# Patient Record
Sex: FEMALE | Race: WHITE | NOT HISPANIC OR LATINO | URBAN - METROPOLITAN AREA
[De-identification: names, ages, dates, MRNs, and addresses within clinical notes are randomized per-mention and may not be internally consistent; named-entity substitution may affect disease eponyms.]

---

## 2020-10-27 ENCOUNTER — TELEPHONE (OUTPATIENT)
Dept: GASTROENTEROLOGY | Facility: MEDICAL CENTER | Age: 20
End: 2020-10-27

## 2020-11-06 ENCOUNTER — OFFICE VISIT (OUTPATIENT)
Dept: GASTROENTEROLOGY | Facility: MEDICAL CENTER | Age: 20
End: 2020-11-06
Payer: COMMERCIAL

## 2020-11-06 VITALS
WEIGHT: 135 LBS | SYSTOLIC BLOOD PRESSURE: 124 MMHG | HEIGHT: 66 IN | HEART RATE: 56 BPM | TEMPERATURE: 98.1 F | DIASTOLIC BLOOD PRESSURE: 82 MMHG | BODY MASS INDEX: 21.69 KG/M2

## 2020-11-06 DIAGNOSIS — K21.9 GASTROESOPHAGEAL REFLUX DISEASE WITHOUT ESOPHAGITIS: Primary | ICD-10-CM

## 2020-11-06 PROCEDURE — 99204 OFFICE O/P NEW MOD 45 MIN: CPT | Performed by: INTERNAL MEDICINE

## 2020-11-06 RX ORDER — GABAPENTIN 100 MG/1
100 CAPSULE ORAL DAILY
COMMUNITY

## 2020-11-10 ENCOUNTER — TELEPHONE (OUTPATIENT)
Dept: GASTROENTEROLOGY | Facility: MEDICAL CENTER | Age: 20
End: 2020-11-10

## 2020-12-29 ENCOUNTER — ANESTHESIA EVENT (OUTPATIENT)
Dept: GASTROENTEROLOGY | Facility: MEDICAL CENTER | Age: 20
End: 2020-12-29

## 2020-12-29 ENCOUNTER — ANESTHESIA (OUTPATIENT)
Dept: GASTROENTEROLOGY | Facility: MEDICAL CENTER | Age: 20
End: 2020-12-29

## 2020-12-29 ENCOUNTER — HOSPITAL ENCOUNTER (OUTPATIENT)
Dept: GASTROENTEROLOGY | Facility: MEDICAL CENTER | Age: 20
Setting detail: OUTPATIENT SURGERY
Discharge: HOME | End: 2020-12-29
Attending: INTERNAL MEDICINE
Payer: COMMERCIAL

## 2020-12-29 VITALS
BODY MASS INDEX: 21.69 KG/M2 | SYSTOLIC BLOOD PRESSURE: 102 MMHG | HEIGHT: 66 IN | OXYGEN SATURATION: 100 % | DIASTOLIC BLOOD PRESSURE: 55 MMHG | RESPIRATION RATE: 16 BRPM | TEMPERATURE: 98.7 F | HEART RATE: 45 BPM | WEIGHT: 135 LBS

## 2020-12-29 VITALS — HEART RATE: 93 BPM

## 2020-12-29 DIAGNOSIS — K21.9 GASTROESOPHAGEAL REFLUX DISEASE WITHOUT ESOPHAGITIS: ICD-10-CM

## 2020-12-29 PROBLEM — G93.5 CHIARI I MALFORMATION (HCC): Status: ACTIVE | Noted: 2020-12-29

## 2020-12-29 LAB
EXT PREGNANCY TEST URINE: NEGATIVE
EXT. CONTROL: NORMAL

## 2020-12-29 PROCEDURE — 88305 TISSUE EXAM BY PATHOLOGIST: CPT | Performed by: PATHOLOGY

## 2020-12-29 PROCEDURE — 43239 EGD BIOPSY SINGLE/MULTIPLE: CPT | Performed by: INTERNAL MEDICINE

## 2020-12-29 PROCEDURE — 81025 URINE PREGNANCY TEST: CPT | Performed by: ANESTHESIOLOGY

## 2020-12-29 RX ORDER — ONDANSETRON 2 MG/ML
4 INJECTION INTRAMUSCULAR; INTRAVENOUS ONCE AS NEEDED
Status: DISCONTINUED | OUTPATIENT
Start: 2020-12-29 | End: 2021-01-02 | Stop reason: HOSPADM

## 2020-12-29 RX ORDER — ALBUTEROL SULFATE 2.5 MG/3ML
2.5 SOLUTION RESPIRATORY (INHALATION) ONCE AS NEEDED
Status: DISCONTINUED | OUTPATIENT
Start: 2020-12-29 | End: 2021-01-02 | Stop reason: HOSPADM

## 2020-12-29 RX ORDER — SODIUM CHLORIDE 9 MG/ML
125 INJECTION, SOLUTION INTRAVENOUS CONTINUOUS
Status: DISCONTINUED | OUTPATIENT
Start: 2020-12-29 | End: 2021-01-02 | Stop reason: HOSPADM

## 2020-12-29 RX ORDER — LIDOCAINE HYDROCHLORIDE 20 MG/ML
INJECTION, SOLUTION EPIDURAL; INFILTRATION; INTRACAUDAL; PERINEURAL AS NEEDED
Status: DISCONTINUED | OUTPATIENT
Start: 2020-12-29 | End: 2020-12-29

## 2020-12-29 RX ORDER — SODIUM CHLORIDE 9 MG/ML
125 INJECTION, SOLUTION INTRAVENOUS CONTINUOUS
Status: CANCELLED | OUTPATIENT
Start: 2020-12-29

## 2020-12-29 RX ORDER — DESOGESTREL AND ETHINYL ESTRADIOL 0.15-0.03
1 KIT ORAL DAILY
COMMUNITY

## 2020-12-29 RX ORDER — PROPOFOL 10 MG/ML
INJECTION, EMULSION INTRAVENOUS AS NEEDED
Status: DISCONTINUED | OUTPATIENT
Start: 2020-12-29 | End: 2020-12-29

## 2020-12-29 RX ADMIN — LIDOCAINE HYDROCHLORIDE 100 MG: 20 INJECTION, SOLUTION EPIDURAL; INFILTRATION; INTRACAUDAL; PERINEURAL at 12:51

## 2020-12-29 RX ADMIN — PROPOFOL 100 MG: 10 INJECTION, EMULSION INTRAVENOUS at 12:51

## 2020-12-29 RX ADMIN — PROPOFOL 100 MG: 10 INJECTION, EMULSION INTRAVENOUS at 12:52

## 2020-12-29 RX ADMIN — SODIUM CHLORIDE: 0.9 INJECTION, SOLUTION INTRAVENOUS at 12:31

## 2020-12-29 RX ADMIN — PROPOFOL 50 MG: 10 INJECTION, EMULSION INTRAVENOUS at 12:54

## 2020-12-29 NOTE — DISCHARGE INSTRUCTIONS
Gastroesophageal Reflux Disease   WHAT YOU NEED TO KNOW:   Gastroesophageal reflux disease (GERD) is reflux that occurs more than twice a week for a few weeks  Reflux means acid and food in the stomach back up into the esophagus  It usually causes heartburn and other symptoms  GERD can cause other health problems over time if it is not treated  DISCHARGE INSTRUCTIONS:   Call your local emergency number (911 in the [address] [facility]) if:   · You have severe chest pain and sudden trouble breathing  Seek care immediately if:   · You have trouble breathing after you vomit  · You have trouble swallowing, or pain with swallowing  · Your bowel movements are black, bloody, or tarry-looking  · Your vomit looks like coffee grounds or has blood in it  Call your doctor or gastroenterologist if:   · You feel full and cannot burp or vomit  · You vomit large amounts, or you vomit often  · You are losing weight without trying  · Your symptoms get worse or do not improve with treatment  · You have questions or concerns about your condition or care  Medicines:   · Medicines  are used to decrease stomach acid  Medicine may also be used to help your lower esophageal sphincter and stomach contract (tighten) more  · Take your medicine as directed  Contact your healthcare provider if you think your medicine is not helping or if you have side effects  Tell him or her if you are allergic to any medicine  Keep a list of the medicines, vitamins, and herbs you take  Include the amounts, and when and why you take them  Bring the list or the pill bottles to follow-up visits  Carry your medicine list with you in case of an emergency  Manage GERD:   · Do not have foods or drinks that may increase heartburn  These include chocolate, peppermint, fried or fatty foods, drinks that contain caffeine, or carbonated drinks (soda)  Other foods include spicy foods, onions, tomatoes, and tomato-based foods   Do not have foods or drinks that can irritate your esophagus, such as citrus fruits, juices, and alcohol  · Do not eat large meals  When you eat a lot of food at one time, your stomach needs more acid to digest it  Eat 6 small meals each day instead of 3 large ones, and eat slowly  Do not eat meals 2 to 3 hours before bedtime  · Elevate the head of your bed  Place 6-inch blocks under the head of your bed frame  You may also use more than one pillow under your head and shoulders while you sleep  · Maintain a healthy weight  If you are overweight, weight loss may help relieve symptoms of GERD  · Do not smoke  Smoking weakens the lower esophageal sphincter and increases the risk of GERD  Ask your healthcare provider for information if you currently smoke and need help to quit  E-cigarettes or smokeless tobacco still contain nicotine  Talk to your healthcare provider before you use these products  · Do not wear clothing that is tight around your waist   Tight clothing can put pressure on your stomach and cause or worsen GERD symptoms  Follow up with your doctor or gastroenterologist as directed:  Write down your questions so you remember to ask them during your visits  © Copyright [address] Information is for End User's use only and may not be sold, redistributed or otherwise used for commercial purposes  All illustrations and images included in CareNotes® are the copyrighted property of A D A M , Inc  or [facility] Mariana Kruse   The above information is an  only  It is not intended as medical advice for individual conditions or treatments  Talk to your doctor, nurse or pharmacist before following any medical regimen to see if it is safe and effective for you  Upper Endoscopy   WHAT YOU NEED TO KNOW:   An upper endoscopy is also called an upper gastrointestinal (GI) endoscopy, or an esophagogastroduodenoscopy (EGD)   You may feel bloated, gassy, or have some abdominal discomfort after your procedure  Your throat may be sore for 24 to 36 hours  You may burp or pass gas from air that is still inside your body  DISCHARGE INSTRUCTIONS:   Call 911 for any of the following:   · You have sudden chest pain or trouble breathing  Seek care immediately if:   · You feel dizzy or faint  · You have trouble swallowing  · Your bowel movements are very dark or black  · Your abdomen is hard and firm and you have severe pain  · You vomit blood  Contact your healthcare provider if:   · You feel full or bloated and cannot burp or pass gas  · You have not had a bowel movement for 3 days after your procedure  · You have neck pain  · You have a fever or chills  · You have nausea or are vomiting  · You have a rash or hives  · You have questions or concerns about your endoscopy  Relieve a sore throat:  Suck on throat lozenges or crushed ice  Gargle with a small amount of warm salt water  Mix 1 teaspoon of salt and 1 cup of warm water to make salt water  Relieve gas and discomfort from bloating:  Lie on your right side with a heating pad on your abdomen  Take short walks to help pass gas  Eat small meals until bloating is relieved  Rest after your procedure: You have been given medicine to relax you  Do not  drive or make important decisions until the day after your procedure  Return to your normal activity as directed  You can usually return to work the day after your procedure  Follow up with your healthcare provider as directed:  Write down your questions so you remember to ask them during your visits  © 2017 [address] is for End User's use only and may not be sold, redistributed or otherwise used for commercial purposes  All illustrations and images included in CareNotes® are the copyrighted property of [employer]  or Merlin Vanegas  The above information is an  only   It is not intended as medical advice for individual conditions or treatments  Talk to your doctor, nurse or pharmacist before following any medical regimen to see if it is safe and effective for you

## 2020-12-29 NOTE — H&P
History and Physical -  [facility]  Haile Pina 23 y o  female MRN: [id number]                  HPI: Haile Pina is a 23y o  year old female who presents for EGD for GERD and to rule out hiatal hernia, esophagitis and H pylori  REVIEW OF SYSTEMS: Per the HPI, and otherwise unremarkable  Historical Information   No past medical history on file  No past surgical history on file  Social History   Social History     Substance and Sexual Activity   Alcohol Use Not on file     Social History     Substance and Sexual Activity   Drug Use Not on file     Social History     Tobacco Use   Smoking Status Never Smoker   Smokeless Tobacco Never Used     No family history on file  Meds/Allergies     (Not in a hospital admission)      Allergies   Allergen Reactions    Lactase      gerd       Objective     There were no vitals taken for this visit  PHYSICAL EXAM    Gen: NAD  CV: RRR  CHEST: Clear  ABD: soft, NT/ND  EXT: no edema      ASSESSMENT/PLAN:  Haile Pina is a 23y o  year old female who presents for EGD for GERD and to rule out hiatal hernia, esophagitis and H pylori  The patient is stable and optimized for the procedure, we reviewed risk and benefits  Risk include but not limited to infection, bleeding, perforation and missing a lesion

## 2021-01-06 ENCOUNTER — TELEPHONE (OUTPATIENT)
Dept: GASTROENTEROLOGY | Facility: MEDICAL CENTER | Age: 21
End: 2021-01-06

## 2021-01-06 NOTE — TELEPHONE ENCOUNTER
[employer]      Result Notes     Palmer Antonio   1/6/2021  2:38 PM EST      Called & left patient a detailed message with benign results    Madelaine Hunt MD   1/4/2021 10:18 AM EST      EGD biopsy was benign, showing inactive gastritis, non urgent follow up in office for GERD

## 2021-02-16 ENCOUNTER — OFFICE VISIT (OUTPATIENT)
Dept: GASTROENTEROLOGY | Facility: MEDICAL CENTER | Age: 21
End: 2021-02-16
Payer: COMMERCIAL

## 2021-02-16 VITALS
SYSTOLIC BLOOD PRESSURE: 122 MMHG | DIASTOLIC BLOOD PRESSURE: 70 MMHG | BODY MASS INDEX: 22.5 KG/M2 | WEIGHT: 140 LBS | TEMPERATURE: 99.2 F | HEIGHT: 66 IN

## 2021-02-16 DIAGNOSIS — K21.9 GASTROESOPHAGEAL REFLUX DISEASE WITHOUT ESOPHAGITIS: Primary | ICD-10-CM

## 2021-02-16 PROCEDURE — 99212 OFFICE O/P EST SF 10 MIN: CPT | Performed by: INTERNAL MEDICINE

## 2021-02-16 NOTE — PROGRESS NOTES
[facility] - Outpatient Follow-up Note  John Grow 23 y o  female MRN: [id number]  Encounter: [id number]          ASSESSMENT AND PLAN:      1  Gastroesophageal reflux disease without esophagitis  She started diet modification with significant improvement of her symptoms  She takes Nexium maybe once every 2 weeks for symptom relief  She understands biopsy result after It was explained to her  Patient is doing much better with diet modification, daily PPI will be unnecessary  For her  Will recommend to follow-up once every 2 to 3 years  ______________________________________________________________________    SUBJECTIVE:      24 yo F with 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 presented for evaluation of acid reflux disease  She underwent EGD for GERD, it showed  · Irregular Z-line 36 cm from the incisors  Variation less than 1 cm, no haro's esophagus, no esophagitis  · The stomach appeared normal  Performed random biopsy  · The duodenum appeared normal  Performed random biopsy    biopsy showed:A  Duodenum, bx, R/O celiac:  - Small bowel mucosa with no significant histopathologic abnormality   - Negative for malabsorption pattern  - Negative for malignancy       B  Stomach, gastric bx, R/O H  pylori:  - Mild chronic inactive gastritis  - Negative for H  pylori by routine H&E   - Negative for malignancy  She started diet modification with significant improvement of her symptoms  She takes Nexium maybe once every 2 weeks for symptom relief  REVIEW OF SYSTEMS IS OTHERWISE NEGATIVE  Historical Information   No past medical history on file    Past Surgical History:   Procedure Laterality Date    BRAIN SURGERY       Social History   Social History     Substance and Sexual Activity   Alcohol Use Yes    Comment: social      Social History     Substance and Sexual Activity   Drug Use Not Currently     Social History     Tobacco Use   Smoking Status Never Smoker   Smokeless Tobacco Never Used     No family history on file  Meds/Allergies       Current Outpatient Medications:     desogestrel-ethinyl estradiol (APRI) 0 15-30 MG-MCG per tablet    Esomeprazole Magnesium (NEXIUM PO)    gabapentin (NEURONTIN) 100 mg capsule    Allergies   Allergen Reactions    Lactase      gerd           Objective     Blood pressure 122/70, temperature 99 2 °F (37 3 °C), temperature source Tympanic, height 5' 6" (1 676 m), weight 63 5 kg (140 lb)  Body mass index is 22 6 kg/m²  PHYSICAL EXAM:      General Appearance:   Alert, cooperative, no distress   HEENT:   Normocephalic, atraumatic, anicteric      Neck:  Supple, symmetrical, trachea midline   Lungs:   Clear to auscultation bilaterally; no rales, rhonchi or wheezing; respirations unlabored    Heart[de-identified]   Regular rate and rhythm; no murmur, rub, or gallop  Abdomen:   Soft, non-tender, non-distended; normal bowel sounds; no masses, no organomegaly    Genitalia:   Deferred    Rectal:   Deferred    Extremities:  No cyanosis, clubbing or edema    Pulses:  2+ and symmetric    Skin:  No jaundice, rashes, or lesions    Lymph nodes:  No palpable cervical lymphadenopathy        Lab Results:   No visits with results within 1 Day(s) from this visit     Latest known visit with results is:   Hospital Outpatient Visit on 12/29/2020   Component Date Value    EXT Preg Test, Ur 12/29/2020 Negative     Control 12/29/2020 Valid     Case Report 12/29/2020                      Value:Surgical Pathology Report                         Case: [id number]                                   Authorizing Provider:  Gabriele Wang MD              Collected:           12/29/2020 1254              Ordering Location:     [facility]        Received:            12/29/2020 [address] [place] [address] Endoscopy                                                     Pathologist:           Evita Fuentes MD Specimens:   A) - Duodenum, bx, R/O celiac                                                                       B) - Stomach, gastric bx, R/O H  pylori                                                    Final Diagnosis 12/29/2020                      Value: This result contains rich text formatting which cannot be displayed here   Additional Information 12/29/2020                      Value: This result contains rich text formatting which cannot be displayed here  Millie Suazo Gross Description 12/29/2020                      Value: This result contains rich text formatting which cannot be displayed here  Radiology Results:   No results found  Answers for HPI/ROS submitted by the patient on 2/9/2021   Abdominal pain  Chronicity: recurrent  Onset: more than 1 year ago  Onset quality: undetermined  Frequency: every several days  Episode duration: 4 hours  Progression since onset: rapidly improving  Pain location: epigastric region, suprapubic region, right flank  Pain - numeric: 5/10  Pain quality: aching, burning, cramping, a sensation of fullness  Radiates to: does not radiate  anorexia: No  arthralgias: No  belching: Yes  constipation: Yes  diarrhea: Yes  dysuria: No  fever: No  flatus: Yes  frequency: Yes  headaches: No  hematochezia: No  hematuria: No  melena: No  myalgias: No  nausea:  No  weight loss: No  vomiting: No  Aggravated by: drinking alcohol, eating  Relieved by: activity, belching, bowel movements, certain positions, liquids, passing flatus

## 2021-11-07 ENCOUNTER — INPATIENT (INPATIENT)
Facility: HOSPITAL | Age: 21
LOS: 1 days | Discharge: HOME | DRG: 156 | End: 2021-11-09
Attending: HOSPITALIST | Admitting: FAMILY MEDICINE
Payer: COMMERCIAL

## 2021-11-07 VITALS
RESPIRATION RATE: 18 BRPM | SYSTOLIC BLOOD PRESSURE: 132 MMHG | TEMPERATURE: 99 F | WEIGHT: 139.99 LBS | HEART RATE: 86 BPM | OXYGEN SATURATION: 99 % | DIASTOLIC BLOOD PRESSURE: 80 MMHG | HEIGHT: 66 IN

## 2021-11-07 DIAGNOSIS — K11.21 ACUTE SIALOADENITIS: ICD-10-CM

## 2021-11-07 LAB
ALBUMIN SERPL ELPH-MCNC: 3.5 G/DL — SIGNIFICANT CHANGE UP (ref 3.3–5)
ALP SERPL-CCNC: 40 U/L — SIGNIFICANT CHANGE UP (ref 40–120)
ALT FLD-CCNC: 29 U/L — SIGNIFICANT CHANGE UP (ref 12–78)
ANION GAP SERPL CALC-SCNC: 7 MMOL/L — SIGNIFICANT CHANGE UP (ref 5–17)
AST SERPL-CCNC: 36 U/L — SIGNIFICANT CHANGE UP (ref 15–37)
BASOPHILS # BLD AUTO: 0.03 K/UL — SIGNIFICANT CHANGE UP (ref 0–0.2)
BASOPHILS NFR BLD AUTO: 0.2 % — SIGNIFICANT CHANGE UP (ref 0–2)
BILIRUB SERPL-MCNC: 0.5 MG/DL — SIGNIFICANT CHANGE UP (ref 0.2–1.2)
BUN SERPL-MCNC: 9 MG/DL — SIGNIFICANT CHANGE UP (ref 7–23)
CALCIUM SERPL-MCNC: 8.9 MG/DL — SIGNIFICANT CHANGE UP (ref 8.5–10.1)
CHLORIDE SERPL-SCNC: 105 MMOL/L — SIGNIFICANT CHANGE UP (ref 96–108)
CO2 SERPL-SCNC: 24 MMOL/L — SIGNIFICANT CHANGE UP (ref 22–31)
CREAT SERPL-MCNC: 0.79 MG/DL — SIGNIFICANT CHANGE UP (ref 0.5–1.3)
EOSINOPHIL # BLD AUTO: 0.04 K/UL — SIGNIFICANT CHANGE UP (ref 0–0.5)
EOSINOPHIL NFR BLD AUTO: 0.3 % — SIGNIFICANT CHANGE UP (ref 0–6)
GLUCOSE SERPL-MCNC: 99 MG/DL — SIGNIFICANT CHANGE UP (ref 70–99)
HCG SERPL-ACNC: <1 MIU/ML — SIGNIFICANT CHANGE UP
HCT VFR BLD CALC: 37 % — SIGNIFICANT CHANGE UP (ref 34.5–45)
HGB BLD-MCNC: 12.9 G/DL — SIGNIFICANT CHANGE UP (ref 11.5–15.5)
IMM GRANULOCYTES NFR BLD AUTO: 0.5 % — SIGNIFICANT CHANGE UP (ref 0–1.5)
LACTATE SERPL-SCNC: 1.2 MMOL/L — SIGNIFICANT CHANGE UP (ref 0.7–2)
LACTATE SERPL-SCNC: 2.2 MMOL/L — HIGH (ref 0.7–2)
LYMPHOCYTES # BLD AUTO: 14.5 % — SIGNIFICANT CHANGE UP (ref 13–44)
LYMPHOCYTES # BLD AUTO: 2.21 K/UL — SIGNIFICANT CHANGE UP (ref 1–3.3)
MCHC RBC-ENTMCNC: 32.6 PG — SIGNIFICANT CHANGE UP (ref 27–34)
MCHC RBC-ENTMCNC: 34.9 GM/DL — SIGNIFICANT CHANGE UP (ref 32–36)
MCV RBC AUTO: 93.4 FL — SIGNIFICANT CHANGE UP (ref 80–100)
MONOCYTES # BLD AUTO: 1.13 K/UL — HIGH (ref 0–0.9)
MONOCYTES NFR BLD AUTO: 7.4 % — SIGNIFICANT CHANGE UP (ref 2–14)
NEUTROPHILS # BLD AUTO: 11.74 K/UL — HIGH (ref 1.8–7.4)
NEUTROPHILS NFR BLD AUTO: 77.1 % — HIGH (ref 43–77)
NRBC # BLD: 0 /100 WBCS — SIGNIFICANT CHANGE UP (ref 0–0)
PLATELET # BLD AUTO: 262 K/UL — SIGNIFICANT CHANGE UP (ref 150–400)
POTASSIUM SERPL-MCNC: 4.3 MMOL/L — SIGNIFICANT CHANGE UP (ref 3.5–5.3)
POTASSIUM SERPL-SCNC: 4.3 MMOL/L — SIGNIFICANT CHANGE UP (ref 3.5–5.3)
PROT SERPL-MCNC: 7.9 G/DL — SIGNIFICANT CHANGE UP (ref 6–8.3)
RBC # BLD: 3.96 M/UL — SIGNIFICANT CHANGE UP (ref 3.8–5.2)
RBC # FLD: 11.8 % — SIGNIFICANT CHANGE UP (ref 10.3–14.5)
SARS-COV-2 RNA SPEC QL NAA+PROBE: SIGNIFICANT CHANGE UP
SODIUM SERPL-SCNC: 136 MMOL/L — SIGNIFICANT CHANGE UP (ref 135–145)
WBC # BLD: 15.23 K/UL — HIGH (ref 3.8–10.5)
WBC # FLD AUTO: 15.23 K/UL — HIGH (ref 3.8–10.5)

## 2021-11-07 PROCEDURE — 99285 EMERGENCY DEPT VISIT HI MDM: CPT

## 2021-11-07 PROCEDURE — 99222 1ST HOSP IP/OBS MODERATE 55: CPT | Mod: GC

## 2021-11-07 PROCEDURE — G1004: CPT

## 2021-11-07 PROCEDURE — 70487 CT MAXILLOFACIAL W/DYE: CPT | Mod: 26,MG

## 2021-11-07 RX ORDER — AMPICILLIN SODIUM AND SULBACTAM SODIUM 250; 125 MG/ML; MG/ML
3 INJECTION, POWDER, FOR SUSPENSION INTRAMUSCULAR; INTRAVENOUS EVERY 6 HOURS
Refills: 0 | Status: DISCONTINUED | OUTPATIENT
Start: 2021-11-07 | End: 2021-11-09

## 2021-11-07 RX ORDER — AMPICILLIN SODIUM AND SULBACTAM SODIUM 250; 125 MG/ML; MG/ML
3 INJECTION, POWDER, FOR SUSPENSION INTRAMUSCULAR; INTRAVENOUS ONCE
Refills: 0 | Status: COMPLETED | OUTPATIENT
Start: 2021-11-07 | End: 2021-11-07

## 2021-11-07 RX ORDER — OXYCODONE AND ACETAMINOPHEN 5; 325 MG/1; MG/1
1 TABLET ORAL ONCE
Refills: 0 | Status: DISCONTINUED | OUTPATIENT
Start: 2021-11-07 | End: 2021-11-07

## 2021-11-07 RX ORDER — SODIUM CHLORIDE 9 MG/ML
1000 INJECTION INTRAMUSCULAR; INTRAVENOUS; SUBCUTANEOUS
Refills: 0 | Status: COMPLETED | OUTPATIENT
Start: 2021-11-07 | End: 2021-11-07

## 2021-11-07 RX ADMIN — OXYCODONE AND ACETAMINOPHEN 1 TABLET(S): 5; 325 TABLET ORAL at 22:29

## 2021-11-07 RX ADMIN — SODIUM CHLORIDE 1000 MILLILITER(S): 9 INJECTION INTRAMUSCULAR; INTRAVENOUS; SUBCUTANEOUS at 21:23

## 2021-11-07 RX ADMIN — SODIUM CHLORIDE 1000 MILLILITER(S): 9 INJECTION INTRAMUSCULAR; INTRAVENOUS; SUBCUTANEOUS at 21:08

## 2021-11-07 RX ADMIN — AMPICILLIN SODIUM AND SULBACTAM SODIUM 200 GRAM(S): 250; 125 INJECTION, POWDER, FOR SUSPENSION INTRAMUSCULAR; INTRAVENOUS at 20:22

## 2021-11-07 RX ADMIN — SODIUM CHLORIDE 1000 MILLILITER(S): 9 INJECTION INTRAMUSCULAR; INTRAVENOUS; SUBCUTANEOUS at 20:22

## 2021-11-07 RX ADMIN — AMPICILLIN SODIUM AND SULBACTAM SODIUM 3 GRAM(S): 250; 125 INJECTION, POWDER, FOR SUSPENSION INTRAMUSCULAR; INTRAVENOUS at 21:23

## 2021-11-07 RX ADMIN — OXYCODONE AND ACETAMINOPHEN 1 TABLET(S): 5; 325 TABLET ORAL at 22:41

## 2021-11-07 RX ADMIN — OXYCODONE AND ACETAMINOPHEN 1 TABLET(S): 5; 325 TABLET ORAL at 20:29

## 2021-11-07 RX ADMIN — SODIUM CHLORIDE 1000 MILLILITER(S): 9 INJECTION INTRAMUSCULAR; INTRAVENOUS; SUBCUTANEOUS at 20:08

## 2021-11-07 NOTE — H&P ADULT - ASSESSMENT
The patient is a 22 yo F with pmhx significant for a chiari malformation s/p repair 2 years ago, who presents with L sided facial swelling and pain that started yesterday and has worsened. Admit for L parotiditis and sialoadenitis.

## 2021-11-07 NOTE — H&P ADULT - PROBLEM SELECTOR PLAN 1
- does not meet sepsis criteria on admission, WBC 15.23, afebrile, RR 18, HR 86. lactate 2.2 --> 1.2  - s/p Unasyn 3g IV x1, 1L NS x1, percocet 5/325mg x2 doses in ED, continue unasyn 3g q6hrs and clindamycin 300mg q8hrs  - continue diet, easy to chew, as tolerated  - f/u blood cultures  - CT maxillofacial w/ contrast: L sided parotiditis w/ L submandibular gland sialoadenitis w/ inflammatory change and fluid w/ reactive lymphadenopathy   - ID consulted, Dr Garcia, f/u recs  - ENT consulted, f/u recs - does not meet sepsis criteria on admission, WBC 15.23, afebrile, RR 18, HR 86. lactate 2.2 --> 1.2  - s/p Unasyn 3g IV x1, 1L NS x1, percocet 5/325mg x2 doses in ED, continue unasyn 3g q6hrs and clindamycin 300mg q8hrs  - continue tylenol 650mg q6hr prn mild pain, percocet 0.5mg q6hr moderate pain, percocet 1.0mg q6hr severe pain  - continue diet, easy to chew, as tolerated  - f/u blood cultures  - CT maxillofacial w/ contrast: L sided parotiditis w/ L submandibular gland sialoadenitis w/ inflammatory change and fluid w/ reactive lymphadenopathy   - ID consulted, Dr Garcia, f/u recs  - ENT consulted, f/u recs

## 2021-11-07 NOTE — H&P ADULT - HISTORY OF PRESENT ILLNESS
The patient is a 20 yo F with no significant pmhx who presents with L sided facial swelling and pain that started yesterday. The patient ?denies trauma to the area. The patient states the pain___    IN THE ED:  Temp  98.6 F, HR 86, /80, RR 18, SpO2 99%  S/P unasyn 3g x1, NS 0.9% 1L x1, percocet 5/325mg x2 doses  Labs significant for WBC 15.23, Neutro 11.74, lactate 2.2 --> 1.2  Imaging: CT maxillofacial w/ contrast: L sided parotiditis w/ L submandibular gland sialoadenitis w/ inflammatory change and fluid w/ reactive lymphadenopathy.      The patient is a 20 yo F with pmhx significant for a chiari malformation s/p repair 2 years ago, who presents with L sided facial swelling and pain that started yesterday and has worsened. The patient endorses 7-8/10 radiating L sided facial pain that radiates up into L temporal region and down into midline of neck. The patient endorses L sided facial drooling earlier today, pain with chewing, SOB, decreased hearing on L. Also endorses having been flying from [place] -> [place] -> [place] all day today. Denies any trauma to the area, any dental concerns (has seen dentist within last year), fully vaccinated for COVID, denies recent sick contacts, including her boyfriend who she was traveling with).      IN THE ED:  Temp  98.6 F, HR 86, /80, RR 18, SpO2 99%  S/P unasyn 3g x1, NS 0.9% 1L x1, percocet 5/325mg x2 doses  Labs significant for WBC 15.23, Neutro 11.74, lactate 2.2 --> 1.2  Imaging: CT maxillofacial w/ contrast: L sided parotiditis w/ L submandibular gland sialoadenitis w/ inflammatory change and fluid w/ reactive lymphadenopathy      The patient is a 22 yo F with pmhx significant for a chiari malformation s/p repair 2 years ago, who presents with L sided facial swelling and pain that started yesterday and has worsened. The patient endorses 7-8/10 radiating L sided facial pain that radiates up into L temporal region and down into midline of neck. The patient endorses L sided facial drooling earlier today, pain with chewing, SOB, decreased hearing on L. Also endorses having been flying from [place] -> [place] -> [place] all day today. Denies any trauma to the area, any dental concerns (has seen dentist within last year), fully vaccinated for COVID, denies recent sick contacts, including her boyfriend who she was traveling with). Currently endorses 4-5/10 pain after Percocet x2 doses in ED.       IN THE ED:  Temp  98.6 F, HR 86, /80, RR 18, SpO2 99%  S/P unasyn 3g x1, NS 0.9% 1L x1, percocet 5/325mg x2 doses  Labs significant for WBC 15.23, Neutro 11.74, lactate 2.2 --> 1.2  Imaging: CT maxillofacial w/ contrast: L sided parotiditis w/ L submandibular gland sialoadenitis w/ inflammatory change and fluid w/ reactive lymphadenopathy      The patient is a 20 yo F with pmhx significant for a chiari malformation s/p repair 2 years ago, who presents with L sided facial swelling and pain that started yesterday and has worsened. The patient endorses 7-8/10 radiating L sided facial pain that radiates up into L temporal region and down into midline of neck. The patient endorses L sided facial drooling earlier today, pain with chewing, SOB, decreased hearing on L. Also endorses having been flying from [place] -> [place] -> [place] all day today. Denies any trauma to the area, any dental concerns (has seen dentist within last year), fully vaccinated for COVID, denies recent sick contacts, including her boyfriend who she was traveling with). Currently endorses 4-5/10 pain after Percocet x2 doses in ED.     Of note, patient endorses <10 ear infections throughout lifetime, with last being July 2020. The patient states around age 3 she had b/l ear tube placement.       IN THE ED:  Temp  98.6 F, HR 86, /80, RR 18, SpO2 99%  S/P unasyn 3g x1, NS 0.9% 1L x1, percocet 5/325mg x2 doses  Labs significant for WBC 15.23, Neutro 11.74, lactate 2.2 --> 1.2  Imaging: CT maxillofacial w/ contrast: L sided parotiditis w/ L submandibular gland sialoadenitis w/ inflammatory change and fluid w/ reactive lymphadenopathy

## 2021-11-07 NOTE — ED ADULT NURSE NOTE - OBJECTIVE STATEMENT
Received pt awake and alert, c/o left side facial swelling since yesterday, now also c/o body aches, denies any dental issues.

## 2021-11-07 NOTE — ED PROVIDER NOTE - CHPI ED SYMPTOMS NEG
no blurred vision/no fever/no numbness/no syncope/no vomiting/no change in level of consciousness/no chills

## 2021-11-07 NOTE — ED PROVIDER NOTE - OBJECTIVE STATEMENT
22 yo F p/w L sided facial pain and swelling since yesterday. No fever/chills. no cough/ uri. Pt fully vaccinated for covid, no recent exposures. no abd pain .no n/v/d. Pt with some dec po intake due to discomfort. no neck pain / stiffness/ photophobia. no recent travel. no sick contacts. no agg/allev factors. no prior events. no other acute co.

## 2021-11-07 NOTE — H&P ADULT - TIME BILLING
care coordination, plan of care discussed with patient face to face with boyfriend at bedside with pt permission

## 2021-11-07 NOTE — H&P ADULT - NSHPPHYSICALEXAM_GEN_ALL_CORE
PHYSICAL EXAM:  Gen: appears comfortable, in NAD  HEENT: NCAT__________  Cardio: regular rate and rhythm, +s1s2, no murmurs, rubs, or gallops  Pulm: CTA b/l, no wheezes, rales or rhonchi  Abdomen: normoactive bowel sounds, soft, nontender, nondistended  Extremities: no edema   Neuro: AAOx3, good movement of all four extremities  Skin: warm and dry, no obvious rashes or lesions, except for facial swelling as noted above PHYSICAL EXAM:  Gen: appears comfortable, in NAD  HEENT: NCAT, mmm, no pharyngeal erythema appreciated though pt unable to fully open mouth 2/2 pain, R ear TM erythematous, L ear TM without erythema, L sided facial swelling extending to TMJ and TTP to midline neck. no obvious lesions or rashes on external skin or external ear  Cardio: regular rate and rhythm, +s1s2, no murmurs, rubs, or gallops  Pulm: CTA b/l, no wheezes, rales or rhonchi  Abdomen: normoactive bowel sounds, soft, nontender, nondistended  Extremities: no edema   Neuro: AAOx3, good movement of all four extremities  Skin: warm and dry, no obvious rashes or lesions, except for facial swelling as noted above PHYSICAL EXAM:  Gen: appears comfortable, in NAD  HEENT: NCAT, mmm, no pharyngeal erythema appreciated though pt unable to fully open mouth 2/2 pain, R ear TM erythematous, L ear TM without erythema, L sided facial swelling extending to TMJ and TTP to midline neck. no obvious lesions or rashes on external skin or external ear  Cardio: regular rate and rhythm, +s1s2, no murmurs, rubs, or gallops  Pulm: CTA b/l, no wheezes, rales or rhonchi  Abdomen: normoactive bowel sounds, soft, nontender, nondistended  Back: no C spine spinous process tenderness, chairi malformation s/p repair scar noted extending from C7 up to C2 without any appreciable erythema, swelling, wounds  Extremities: no edema   Neuro: AAOx3, good movement of all four extremities  Skin: warm and dry, no obvious rashes or lesions, except for facial swelling as noted above

## 2021-11-07 NOTE — H&P ADULT - ATTENDING COMMENTS
22 yo F with pmhx significant for a chiari malformation s/p repair 2 years ago, who presents with L sided facial swelling and pain that started yesterday and has worsened. Admit for L parotiditis and sialoadenitis. Plan: cont iv antibx, apprec ID Dr Ai jimenez, apprec ENT recs Dr Gaffney, monitor clinical course, warm compresses

## 2021-11-07 NOTE — H&P ADULT - NSHPREVIEWOFSYSTEMS_GEN_ALL_CORE
REVIEW OF SYSTEMS:  Constitutional: denies fever or chills  HEENT: endorses L facial pain, swelling. denies headache, dizziness, or lightheadedness  Respiratory: denies SOB or cough   Cardiovascular: denies CP or palpitations  Gastrointestinal: denies nausea, vomiting, diarrhea, abdominal pain, or hematochezia  Genitourinary: denies hematuria, dysuria, frequency, urgency  Musculoskeletal: denies muscle aches, joint swelling, or muscle weakness

## 2021-11-08 ENCOUNTER — TRANSCRIPTION ENCOUNTER (OUTPATIENT)
Age: 21
End: 2021-11-08

## 2021-11-08 DIAGNOSIS — G93.5 COMPRESSION OF BRAIN: Chronic | ICD-10-CM

## 2021-11-08 DIAGNOSIS — Z29.9 ENCOUNTER FOR PROPHYLACTIC MEASURES, UNSPECIFIED: ICD-10-CM

## 2021-11-08 DIAGNOSIS — R60.0 LOCALIZED EDEMA: ICD-10-CM

## 2021-11-08 LAB
ALBUMIN SERPL ELPH-MCNC: 2.8 G/DL — LOW (ref 3.3–5)
ALP SERPL-CCNC: 36 U/L — LOW (ref 40–120)
ALT FLD-CCNC: 20 U/L — SIGNIFICANT CHANGE UP (ref 12–78)
ANION GAP SERPL CALC-SCNC: 7 MMOL/L — SIGNIFICANT CHANGE UP (ref 5–17)
AST SERPL-CCNC: 17 U/L — SIGNIFICANT CHANGE UP (ref 15–37)
BASOPHILS # BLD AUTO: 0.02 K/UL — SIGNIFICANT CHANGE UP (ref 0–0.2)
BASOPHILS NFR BLD AUTO: 0.2 % — SIGNIFICANT CHANGE UP (ref 0–2)
BILIRUB SERPL-MCNC: 0.6 MG/DL — SIGNIFICANT CHANGE UP (ref 0.2–1.2)
BUN SERPL-MCNC: 5 MG/DL — LOW (ref 7–23)
CALCIUM SERPL-MCNC: 8.5 MG/DL — SIGNIFICANT CHANGE UP (ref 8.5–10.1)
CHLORIDE SERPL-SCNC: 109 MMOL/L — HIGH (ref 96–108)
CO2 SERPL-SCNC: 23 MMOL/L — SIGNIFICANT CHANGE UP (ref 22–31)
CREAT SERPL-MCNC: 0.69 MG/DL — SIGNIFICANT CHANGE UP (ref 0.5–1.3)
CRP SERPL-MCNC: 61 MG/L — HIGH
EOSINOPHIL # BLD AUTO: 0.04 K/UL — SIGNIFICANT CHANGE UP (ref 0–0.5)
EOSINOPHIL NFR BLD AUTO: 0.5 % — SIGNIFICANT CHANGE UP (ref 0–6)
ERYTHROCYTE [SEDIMENTATION RATE] IN BLOOD: 16 MM/HR — HIGH (ref 0–15)
GLUCOSE SERPL-MCNC: 120 MG/DL — HIGH (ref 70–99)
HCT VFR BLD CALC: 34.1 % — LOW (ref 34.5–45)
HGB BLD-MCNC: 11.7 G/DL — SIGNIFICANT CHANGE UP (ref 11.5–15.5)
HIV 1+2 AB+HIV1 P24 AG SERPL QL IA: SIGNIFICANT CHANGE UP
IMM GRANULOCYTES NFR BLD AUTO: 0.5 % — SIGNIFICANT CHANGE UP (ref 0–1.5)
LYMPHOCYTES # BLD AUTO: 0.78 K/UL — LOW (ref 1–3.3)
LYMPHOCYTES # BLD AUTO: 9.3 % — LOW (ref 13–44)
MCHC RBC-ENTMCNC: 32.4 PG — SIGNIFICANT CHANGE UP (ref 27–34)
MCHC RBC-ENTMCNC: 34.3 GM/DL — SIGNIFICANT CHANGE UP (ref 32–36)
MCV RBC AUTO: 94.5 FL — SIGNIFICANT CHANGE UP (ref 80–100)
MONOCYTES # BLD AUTO: 0.54 K/UL — SIGNIFICANT CHANGE UP (ref 0–0.9)
MONOCYTES NFR BLD AUTO: 6.5 % — SIGNIFICANT CHANGE UP (ref 2–14)
MUV AB SER-ACNC: POSITIVE — SIGNIFICANT CHANGE UP
MUV IGG FLD-ACNC: 69.6 AU/ML — SIGNIFICANT CHANGE UP
NEUTROPHILS # BLD AUTO: 6.94 K/UL — SIGNIFICANT CHANGE UP (ref 1.8–7.4)
NEUTROPHILS NFR BLD AUTO: 83 % — HIGH (ref 43–77)
NRBC # BLD: 0 /100 WBCS — SIGNIFICANT CHANGE UP (ref 0–0)
PLATELET # BLD AUTO: 187 K/UL — SIGNIFICANT CHANGE UP (ref 150–400)
POTASSIUM SERPL-MCNC: 3.7 MMOL/L — SIGNIFICANT CHANGE UP (ref 3.5–5.3)
POTASSIUM SERPL-SCNC: 3.7 MMOL/L — SIGNIFICANT CHANGE UP (ref 3.5–5.3)
PROT SERPL-MCNC: 6.5 G/DL — SIGNIFICANT CHANGE UP (ref 6–8.3)
RBC # BLD: 3.61 M/UL — LOW (ref 3.8–5.2)
RBC # FLD: 11.9 % — SIGNIFICANT CHANGE UP (ref 10.3–14.5)
RHEUMATOID FACT SERPL-ACNC: <10 IU/ML — SIGNIFICANT CHANGE UP (ref 0–13)
SODIUM SERPL-SCNC: 139 MMOL/L — SIGNIFICANT CHANGE UP (ref 135–145)
WBC # BLD: 8.36 K/UL — SIGNIFICANT CHANGE UP (ref 3.8–10.5)
WBC # FLD AUTO: 8.36 K/UL — SIGNIFICANT CHANGE UP (ref 3.8–10.5)

## 2021-11-08 PROCEDURE — 99233 SBSQ HOSP IP/OBS HIGH 50: CPT

## 2021-11-08 PROCEDURE — 99222 1ST HOSP IP/OBS MODERATE 55: CPT

## 2021-11-08 RX ORDER — OXYCODONE AND ACETAMINOPHEN 5; 325 MG/1; MG/1
1 TABLET ORAL EVERY 6 HOURS
Refills: 0 | Status: DISCONTINUED | OUTPATIENT
Start: 2021-11-08 | End: 2021-11-09

## 2021-11-08 RX ORDER — DEXAMETHASONE 0.5 MG/5ML
5 ELIXIR ORAL EVERY 8 HOURS
Refills: 0 | Status: DISCONTINUED | OUTPATIENT
Start: 2021-11-08 | End: 2021-11-09

## 2021-11-08 RX ORDER — LANOLIN ALCOHOL/MO/W.PET/CERES
3 CREAM (GRAM) TOPICAL AT BEDTIME
Refills: 0 | Status: DISCONTINUED | OUTPATIENT
Start: 2021-11-08 | End: 2021-11-09

## 2021-11-08 RX ORDER — ONDANSETRON 8 MG/1
4 TABLET, FILM COATED ORAL EVERY 8 HOURS
Refills: 0 | Status: DISCONTINUED | OUTPATIENT
Start: 2021-11-08 | End: 2021-11-09

## 2021-11-08 RX ORDER — ACETAMINOPHEN 500 MG
650 TABLET ORAL EVERY 6 HOURS
Refills: 0 | Status: DISCONTINUED | OUTPATIENT
Start: 2021-11-08 | End: 2021-11-09

## 2021-11-08 RX ORDER — SODIUM CHLORIDE 9 MG/ML
1000 INJECTION INTRAMUSCULAR; INTRAVENOUS; SUBCUTANEOUS
Refills: 0 | Status: DISCONTINUED | OUTPATIENT
Start: 2021-11-08 | End: 2021-11-09

## 2021-11-08 RX ORDER — IBUPROFEN 200 MG
600 TABLET ORAL EVERY 8 HOURS
Refills: 0 | Status: DISCONTINUED | OUTPATIENT
Start: 2021-11-08 | End: 2021-11-09

## 2021-11-08 RX ORDER — INFLUENZA VIRUS VACCINE 15; 15; 15; 15 UG/.5ML; UG/.5ML; UG/.5ML; UG/.5ML
0.5 SUSPENSION INTRAMUSCULAR ONCE
Refills: 0 | Status: COMPLETED | OUTPATIENT
Start: 2021-11-08 | End: 2021-11-09

## 2021-11-08 RX ORDER — BENZOCAINE AND MENTHOL 5; 1 G/100ML; G/100ML
1 LIQUID ORAL EVERY 4 HOURS
Refills: 0 | Status: DISCONTINUED | OUTPATIENT
Start: 2021-11-08 | End: 2021-11-09

## 2021-11-08 RX ORDER — OXYCODONE AND ACETAMINOPHEN 5; 325 MG/1; MG/1
0.5 TABLET ORAL EVERY 4 HOURS
Refills: 0 | Status: DISCONTINUED | OUTPATIENT
Start: 2021-11-08 | End: 2021-11-09

## 2021-11-08 RX ADMIN — Medication 600 MILLIGRAM(S): at 09:30

## 2021-11-08 RX ADMIN — Medication 600 MILLIGRAM(S): at 08:39

## 2021-11-08 RX ADMIN — OXYCODONE AND ACETAMINOPHEN 1 TABLET(S): 5; 325 TABLET ORAL at 03:02

## 2021-11-08 RX ADMIN — Medication 300 MILLIGRAM(S): at 06:13

## 2021-11-08 RX ADMIN — AMPICILLIN SODIUM AND SULBACTAM SODIUM 200 GRAM(S): 250; 125 INJECTION, POWDER, FOR SUSPENSION INTRAMUSCULAR; INTRAVENOUS at 08:39

## 2021-11-08 RX ADMIN — Medication 5 MILLIGRAM(S): at 08:35

## 2021-11-08 RX ADMIN — AMPICILLIN SODIUM AND SULBACTAM SODIUM 200 GRAM(S): 250; 125 INJECTION, POWDER, FOR SUSPENSION INTRAMUSCULAR; INTRAVENOUS at 14:04

## 2021-11-08 RX ADMIN — Medication 5 MILLIGRAM(S): at 14:04

## 2021-11-08 RX ADMIN — Medication 5 MILLIGRAM(S): at 21:04

## 2021-11-08 RX ADMIN — SODIUM CHLORIDE 75 MILLILITER(S): 9 INJECTION INTRAMUSCULAR; INTRAVENOUS; SUBCUTANEOUS at 12:54

## 2021-11-08 RX ADMIN — OXYCODONE AND ACETAMINOPHEN 1 TABLET(S): 5; 325 TABLET ORAL at 02:37

## 2021-11-08 RX ADMIN — AMPICILLIN SODIUM AND SULBACTAM SODIUM 200 GRAM(S): 250; 125 INJECTION, POWDER, FOR SUSPENSION INTRAMUSCULAR; INTRAVENOUS at 02:38

## 2021-11-08 RX ADMIN — AMPICILLIN SODIUM AND SULBACTAM SODIUM 200 GRAM(S): 250; 125 INJECTION, POWDER, FOR SUSPENSION INTRAMUSCULAR; INTRAVENOUS at 20:57

## 2021-11-08 NOTE — DISCHARGE NOTE PROVIDER - NSDCMRMEDTOKEN_GEN_ALL_CORE_FT
Apri 0.15 mg-0.03 mg oral tablet: 1 tab(s) orally once a day   amoxicillin-clavulanate 875 mg-125 mg oral tablet: 1 tab(s) orally every 12 hours   Apri 0.15 mg-0.03 mg oral tablet: 1 tab(s) orally once a day  predniSONE 20 mg oral tablet: 2 tab(s) orally once a day

## 2021-11-08 NOTE — DISCHARGE NOTE PROVIDER - PROVIDER TOKENS
FREE:[LAST:[Jaspreet],FIRST:[Jose Angel],PHONE:[phone]],FAX:[(   [phone]    -],ADDRESS:[[facility] [place] [address]],FOLLOWUP:[1 week],ESTABLISHEDPATIENT:[T]]

## 2021-11-08 NOTE — PROGRESS NOTE ADULT - ASSESSMENT
Patient is a 22 yo F with pmhx significant for a chiari malformation s/p repair 2 years ago, who presents with worsening L sided facial swelling and pain x2 days. Admitted for L parotiditis and sialoadenitis.

## 2021-11-08 NOTE — DISCHARGE NOTE PROVIDER - HOSPITAL COURSE
FROM ADMISSION H+P:   HPI:  The patient is a 22 yo F with pmhx significant for a chiari malformation s/p repair 2 years ago, who presents with L sided facial swelling and pain that started yesterday and has worsened. The patient endorses 7-8/10 radiating L sided facial pain that radiates up into L temporal region and down into midline of neck. The patient endorses L sided facial drooling earlier today, pain with chewing, SOB, decreased hearing on L. Also endorses having been [place] from [place] today. Denies any trauma to the area, any dental concerns (has seen dentist within last year), fully vaccinated for COVID, denies recent sick contacts, including her boyfriend who she was traveling with). Currently endorses 4-5/10 pain after Percocet x2 doses in ED.     Of note, patient endorses <10 ear infections throughout lifetime, with last being July 2020. The patient states around age 3 she had b/l ear tube placement.       IN THE ED:  Temp  98.6 F, HR 86, /80, RR 18, SpO2 99%  S/P unasyn 3g x1, NS 0.9% 1L x1, percocet 5/325mg x2 doses  Labs significant for WBC 15.23, Neutro 11.74, lactate 2.2 --> 1.2  Imaging: CT maxillofacial w/ contrast: L sided parotiditis w/ L submandibular gland sialoadenitis w/ inflammatory change and fluid w/ reactive lymphadenopathy      (07 Nov 2021 23:17)      ---  HOSPITAL COURSE: Patient presented with L sided facial swelling with associated pain. She was found to have L sided parotiditis with L submandibular gland sialoadenitis. She was started on Unasyn and Clindamycin and started on a pain regimen. She was seen and evaluated by ENT ______.     ---  CONSULTANTS:   ENT- Dr. Linh LYMAN- Dr. Garcia     ---  TIME SPENT:  I, the attending physician, was physically present for the key portions of the evaluation and management (E/M) service provided. The total amount of time spent reviewing the hospital notes, laboratory values, imaging findings, assessing/counseling the patient, discussing with consultant physicians, social work, nursing staff was -- minutes    ---  Primary care provider was made aware of plan for discharge:      [  ] NO     [  ] YES   FROM ADMISSION H+P:   HPI:  The patient is a 20 yo F with pmhx significant for a chiari malformation s/p repair 2 years ago, who presents with L sided facial swelling and pain that started yesterday and has worsened. The patient endorses 7-8/10 radiating L sided facial pain that radiates up into L temporal region and down into midline of neck. The patient endorses L sided facial drooling earlier today, pain with chewing, SOB, decreased hearing on L. Also endorses having been flying from [place] -> [place] all day today. Denies any trauma to the area, any dental concerns (has seen dentist within last year), fully vaccinated for COVID, denies recent sick contacts, including her boyfriend who she was traveling with). Currently endorses 4-5/10 pain after Percocet x2 doses in ED.     Of note, patient endorses <10 ear infections throughout lifetime, with last being July 2020. The patient states around age 3 she had b/l ear tube placement.       IN THE ED:  Temp  98.6 F, HR 86, /80, RR 18, SpO2 99%  S/P unasyn 3g x1, NS 0.9% 1L x1, percocet 5/325mg x2 doses  Labs significant for WBC 15.23, Neutro 11.74, lactate 2.2 --> 1.2  Imaging: CT maxillofacial w/ contrast: L sided parotiditis w/ L submandibular gland sialoadenitis w/ inflammatory change and fluid w/ reactive lymphadenopathy      (07 Nov 2021 23:17)      ---  HOSPITAL COURSE: Patient presented with L sided facial swelling with associated pain. She was found to have L sided parotiditis with L submandibular gland sialoadenitis. She was started on Unasyn and Clindamycin and started on a pain regimen. She was seen and evaluated by ENT ______. Patient showed improvement throughout hospitalization. Patient was seen and examined on day of discharge. Patient was medically optimized for discharge with close outpatient follow up.       ---  CONSULTANTS:   ENT- Dr. Melton   ID- Dr. Garcia     ---  TIME SPENT:  I, the attending physician, was physically present for the key portions of the evaluation and management (E/M) service provided. The total amount of time spent reviewing the hospital notes, laboratory values, imaging findings, assessing/counseling the patient, discussing with consultant physicians, social work, nursing staff was -- minutes    ---  Primary care provider was made aware of plan for discharge:      [  ] NO     [  ] YES   FROM ADMISSION H+P:   HPI:  The patient is a 22 yo F with pmhx significant for a chiari malformation s/p repair 2 years ago, who presents with L sided facial swelling and pain that started yesterday and has worsened. The patient endorses 7-8/10 radiating L sided facial pain that radiates up into L temporal region and down into midline of neck. The patient endorses L sided facial drooling earlier today, pain with chewing, SOB, decreased hearing on L. Also endorses having been flying from [place] -> [place] all day today. Denies any trauma to the area, any dental concerns (has seen dentist within last year), fully vaccinated for COVID, denies recent sick contacts, including her boyfriend who she was traveling with). Currently endorses 4-5/10 pain after Percocet x2 doses in ED.     Of note, patient endorses <10 ear infections throughout lifetime, with last being July 2020. The patient states around age 3 she had b/l ear tube placement.       IN THE ED:  Temp  98.6 F, HR 86, /80, RR 18, SpO2 99%  S/P unasyn 3g x1, NS 0.9% 1L x1, percocet 5/325mg x2 doses  Labs significant for WBC 15.23, Neutro 11.74, lactate 2.2 --> 1.2  Imaging: CT maxillofacial w/ contrast: L sided parotiditis w/ L submandibular gland sialoadenitis w/ inflammatory change and fluid w/ reactive lymphadenopathy      (07 Nov 2021 23:17)      ---  HOSPITAL COURSE: Patient presented with L sided facial swelling with associated pain. She was found to have L sided parotiditis with L submandibular gland sialoadenitis. She was started on Unasyn and Clindamycin, IV Decadron, and started on a pain regimen. ID was consulted and recommended discontinuing Clindamycin because of overlapping coverage with Unasyn. They also recommended sending Mumps IgG and IGM and basic rheum workup to rule out possible rheumatologic causes. Mumps IgG positive, RF negative, remainder of workup still pending at time of discharge. She was seen and evaluated by ENT which recommended continued antibiotics and warm compresses. Patient showed improvement throughout hospitalization.     Patient was seen and examined on day of discharge. Patient without any complaints on day of discharge, states that L facial swelling, pain, and muffled hearing is much improved.  Patient was medically optimized for discharge with close outpatient follow up.       Vital Signs Last 24 Hrs  T(C): 36.7 (09 Nov 2021 04:15), Max: 36.9 (08 Nov 2021 12:41)  T(F): 98 (09 Nov 2021 04:15), Max: 98.4 (08 Nov 2021 12:41)  HR: 81 (09 Nov 2021 04:15) (81 - 86)  BP: 110/68 (09 Nov 2021 04:15) (107/69 - 144/81)  RR: 18 (09 Nov 2021 04:15) (18 - 18)  SpO2: 95% (09 Nov 2021 04:15) (95% - 96%)    PHYSICAL EXAM   GENERAL: not in acute distress at rest   HEENT:  anicteric, moist mucous membranes, normal oropharynx, mild L sided facial swelling and TTP   CHEST/LUNG:  CTA b/l, no rales, wheezes, or rhonchi  HEART:  RRR, S1, S2  ABDOMEN:  BS+, soft, nontender, nondistended  EXTREMITIES: no edema, cyanosis, or calf tenderness  NERVOUS SYSTEM: answers questions and follows commands appropriately      ---  CONSULTANTS:   ENT- Dr. Melton   ID- Dr. Garcia     ---  TIME SPENT:  I, the attending physician, was physically present for the key portions of the evaluation and management (E/M) service provided. The total amount of time spent reviewing the hospital notes, laboratory values, imaging findings, assessing/counseling the patient, discussing with consultant physicians, social work, nursing staff was -- minutes    ---  Primary care provider was made aware of plan for discharge:      [  ] NO     [ x ] YES   FROM ADMISSION H+P:   HPI:  The patient is a 20 yo F with pmhx significant for a chiari malformation s/p repair 2 years ago, who presents with L sided facial swelling and pain that started yesterday and has worsened. The patient endorses 7-8/10 radiating L sided facial pain that radiates up into L temporal region and down into midline of neck. The patient endorses L sided facial drooling earlier today, pain with chewing, SOB, decreased hearing on L. Also endorses having been flying from [place] -> [place] all day today. Denies any trauma to the area, any dental concerns (has seen dentist within last year), fully vaccinated for COVID, denies recent sick contacts, including her boyfriend who she was traveling with). Currently endorses 4-5/10 pain after Percocet x2 doses in ED.     Of note, patient endorses <10 ear infections throughout lifetime, with last being July 2020. The patient states around age 3 she had b/l ear tube placement.       IN THE ED:  Temp  98.6 F, HR 86, /80, RR 18, SpO2 99%  S/P unasyn 3g x1, NS 0.9% 1L x1, percocet 5/325mg x2 doses  Labs significant for WBC 15.23, Neutro 11.74, lactate 2.2 --> 1.2  Imaging: CT maxillofacial w/ contrast: L sided parotiditis w/ L submandibular gland sialoadenitis w/ inflammatory change and fluid w/ reactive lymphadenopathy      (07 Nov 2021 23:17)      ---  HOSPITAL COURSE: Patient presented with L sided facial swelling with associated pain. She was found to have L sided parotiditis with L submandibular gland sialoadenitis. She was started on Unasyn and Clindamycin, IV Decadron, and started on a pain regimen. ID was consulted and recommended discontinuing Clindamycin because of overlapping coverage with Unasyn. They also recommended sending Mumps IgG and IGM and basic rheum workup to rule out possible rheumatologic causes. Blood cultures NGTD. Mumps IgG positive, RF negative, remainder of workup still pending at time of discharge. She was seen and evaluated by ENT which recommended continued antibiotics and warm compresses. Patient showed improvement throughout hospitalization.     Patient was seen and examined on day of discharge. Patient without any complaints on day of discharge, states that L facial swelling, pain, and muffled hearing is much improved.  Patient was medically optimized for discharge with close outpatient follow up.       Vital Signs Last 24 Hrs  T(C): 36.7 (09 Nov 2021 04:15), Max: 36.9 (08 Nov 2021 12:41)  T(F): 98 (09 Nov 2021 04:15), Max: 98.4 (08 Nov 2021 12:41)  HR: 81 (09 Nov 2021 04:15) (81 - 86)  BP: 110/68 (09 Nov 2021 04:15) (107/69 - 144/81)  RR: 18 (09 Nov 2021 04:15) (18 - 18)  SpO2: 95% (09 Nov 2021 04:15) (95% - 96%)    PHYSICAL EXAM   GENERAL: not in acute distress at rest   HEENT:  anicteric, moist mucous membranes, normal oropharynx, mild L sided facial swelling and TTP   CHEST/LUNG:  CTA b/l, no rales, wheezes, or rhonchi  HEART:  RRR, S1, S2  ABDOMEN:  BS+, soft, nontender, nondistended  EXTREMITIES: no edema, cyanosis, or calf tenderness  NERVOUS SYSTEM: answers questions and follows commands appropriately      ---  CONSULTANTS:   ENT- Dr. Melton   ID- Dr. Garcia     ---  TIME SPENT:  I, the attending physician, was physically present for the key portions of the evaluation and management (E/M) service provided. The total amount of time spent reviewing the hospital notes, laboratory values, imaging findings, assessing/counseling the patient, discussing with consultant physicians, social work, nursing staff was -- minutes    ---  Primary care provider was made aware of plan for discharge:      [  ] NO     [ x ] YES   FROM ADMISSION H+P:   HPI:  The patient is a 22 yo F with pmhx significant for a chiari malformation s/p repair 2 years ago, who presents with L sided facial swelling and pain that started yesterday and has worsened. The patient endorses 7-8/10 radiating L sided facial pain that radiates up into L temporal region and down into midline of neck. The patient endorses L sided facial drooling earlier today, pain with chewing, SOB, decreased hearing on L. Also endorses having been flying from [place] -> [place] all day today. Denies any trauma to the area, any dental concerns (has seen dentist within last year), fully vaccinated for COVID, denies recent sick contacts, including her boyfriend who she was traveling with). Currently endorses 4-5/10 pain after Percocet x2 doses in ED.     Of note, patient endorses <10 ear infections throughout lifetime, with last being July 2020. The patient states around age 3 she had b/l ear tube placement.       IN THE ED:  Temp  98.6 F, HR 86, /80, RR 18, SpO2 99%  S/P unasyn 3g x1, NS 0.9% 1L x1, percocet 5/325mg x2 doses  Labs significant for WBC 15.23, Neutro 11.74, lactate 2.2 --> 1.2  Imaging: CT maxillofacial w/ contrast: L sided parotiditis w/ L submandibular gland sialoadenitis w/ inflammatory change and fluid w/ reactive lymphadenopathy      (07 Nov 2021 23:17)      ---  HOSPITAL COURSE: Patient presented with L sided facial swelling with associated pain. She was found to have L sided parotiditis with L submandibular gland sialoadenitis. She was started on Unasyn and Clindamycin, IV Decadron, and started on a pain regimen. ID was consulted and recommended discontinuing Clindamycin because of overlapping coverage with Unasyn. They also recommended sending Mumps IgG and IGM and basic rheum workup to rule out possible rheumatologic causes. Blood cultures NGTD. Mumps IgG positive, RF negative, remainder of workup still pending at time of discharge. She was seen and evaluated by ENT which recommended continued antibiotics and warm compresses. Patient showed improvement throughout hospitalization.     Patient was seen and examined on day of discharge. Patient without any complaints on day of discharge, states that L facial swelling, pain, and muffled hearing is much improved.  Patient was medically optimized for discharge with close outpatient follow up.     REVIEW OF SYSTEMS:   General: no fever, chills  Eyes: no vision changes  ENT: no hearing changes, nasal congestion, or sore throat  CV: no chest pain or palpitations  Pulm: no SOB, wheezing, cough, or hemoptysis  Abd/GI: no nausea, vomiting, diarrhea, constipation, abd pain  : no dysuria, hematuria, urinary frequency  MSK: no joint pain or myalgias  Neuro: no syncope, dizziness, focal weakness    Vital Signs Last 24 Hrs  T(C): 36.7 (09 Nov 2021 04:15), Max: 36.9 (08 Nov 2021 12:41)  T(F): 98 (09 Nov 2021 04:15), Max: 98.4 (08 Nov 2021 12:41)  HR: 81 (09 Nov 2021 04:15) (81 - 86)  BP: 110/68 (09 Nov 2021 04:15) (107/69 - 144/81)  RR: 18 (09 Nov 2021 04:15) (18 - 18)  SpO2: 95% (09 Nov 2021 04:15) (95% - 96%)    PHYSICAL EXAM   GENERAL: not in acute distress at rest   HEENT:  anicteric, moist mucous membranes, normal oropharynx, mild L sided facial swelling and TTP   CHEST/LUNG:  CTA b/l, no rales, wheezes, or rhonchi  HEART:  RRR, S1, S2  ABDOMEN:  BS+, soft, nontender, nondistended  EXTREMITIES: no edema, cyanosis, or calf tenderness  NERVOUS SYSTEM: answers questions and follows commands appropriately      ---  CONSULTANTS:   ENT- Dr. Melton   ID- Dr. Garcia     ---  TIME SPENT:  I, the attending physician, was physically present for the key portions of the evaluation and management (E/M) service provided. The total amount of time spent reviewing the hospital notes, laboratory values, imaging findings, assessing/counseling the patient, discussing with consultant physicians, social work, nursing staff was -- minutes    ---  Primary care provider was made aware of plan for discharge:      [  ] NO     [ x ] YES   FROM ADMISSION H+P:   HPI:  The patient is a 20 yo F with pmhx significant for a chiari malformation s/p repair 2 years ago, who presents with L sided facial swelling and pain that started yesterday and has worsened. The patient endorses 7-8/10 radiating L sided facial pain that radiates up into L temporal region and down into midline of neck. The patient endorses L sided facial drooling earlier today, pain with chewing, SOB, decreased hearing on L. Also endorses having been flying from [place] -> [place] all day today. Denies any trauma to the area, any dental concerns (has seen dentist within last year), fully vaccinated for COVID, denies recent sick contacts, including her boyfriend who she was traveling with). Currently endorses 4-5/10 pain after Percocet x2 doses in ED.     Of note, patient endorses <10 ear infections throughout lifetime, with last being July 2020. The patient states around age 3 she had b/l ear tube placement.       IN THE ED:  Temp  98.6 F, HR 86, /80, RR 18, SpO2 99%  S/P unasyn 3g x1, NS 0.9% 1L x1, percocet 5/325mg x2 doses  Labs significant for WBC 15.23, Neutro 11.74, lactate 2.2 --> 1.2  Imaging: CT maxillofacial w/ contrast: L sided parotiditis w/ L submandibular gland sialoadenitis w/ inflammatory change and fluid w/ reactive lymphadenopathy      (07 Nov 2021 23:17)      ---  HOSPITAL COURSE: Patient presented with L sided facial swelling with associated pain. She was found to have L sided parotiditis with L submandibular gland sialoadenitis. She was started on Unasyn and Clindamycin, IV Decadron, and started on a pain regimen. ID was consulted and recommended discontinuing Clindamycin because of overlapping coverage with Unasyn. They also recommended sending Mumps IgG and IGM and basic rheum workup to rule out possible rheumatologic causes. Blood cultures NGTD. Mumps IgG positive, RF negative, remainder of workup still pending at time of discharge. She was seen and evaluated by ENT which recommended continued antibiotics and warm compresses. Patient showed improvement throughout hospitalization.     Patient was seen and examined on day of discharge. Patient without any complaints on day of discharge, states that L facial swelling, pain, and muffled hearing is much improved.  Patient was medically optimized for discharge with close outpatient follow up.     REVIEW OF SYSTEMS:   General: no fever, chills  Eyes: no vision changes  ENT: no hearing changes, nasal congestion, or sore throat  CV: no chest pain or palpitations  Pulm: no SOB, wheezing, cough, or hemoptysis  Abd/GI: no nausea, vomiting, diarrhea, constipation, abd pain  : no dysuria, hematuria, urinary frequency  MSK: no joint pain or myalgias  Neuro: no syncope, dizziness, focal weakness    Vital Signs Last 24 Hrs  T(C): 36.7 (09 Nov 2021 04:15), Max: 36.9 (08 Nov 2021 12:41)  T(F): 98 (09 Nov 2021 04:15), Max: 98.4 (08 Nov 2021 12:41)  HR: 81 (09 Nov 2021 04:15) (81 - 86)  BP: 110/68 (09 Nov 2021 04:15) (107/69 - 144/81)  RR: 18 (09 Nov 2021 04:15) (18 - 18)  SpO2: 95% (09 Nov 2021 04:15) (95% - 96%)    PHYSICAL EXAM   GENERAL: not in acute distress at rest   HEENT:  anicteric, moist mucous membranes, normal oropharynx, mild L sided facial swelling and TTP   CHEST/LUNG:  CTA b/l, no rales, wheezes, or rhonchi  HEART:  RRR, S1, S2  ABDOMEN:  BS+, soft, nontender, nondistended  EXTREMITIES: no edema, cyanosis, or calf tenderness  NERVOUS SYSTEM: answers questions and follows commands appropriately      ---  CONSULTANTS:   ENT- Dr. Melton   ID- Dr. Garcia     ---  TIME SPENT:  I, the attending physician, was physically present for the key portions of the evaluation and management (E/M) service provided. The total amount of time spent reviewing the hospital notes, laboratory values, imaging findings, assessing/counseling the patient, discussing with consultant physicians, social work, nursing staff was 36 minutes    ---  Primary care provider was made aware of plan for discharge:      [  ] NO     [ x ] YES

## 2021-11-08 NOTE — PROGRESS NOTE ADULT - PROBLEM SELECTOR PLAN 1
Suspect sialoadenitis 2/2 obstruction from salivary stone vs thick saliva vs stenosis or salivary canals causing swelling in glands and possibly 2' superinfection with bacteria  - CT maxillofacial w/ contrast: L sided parotiditis w/ L submandibular gland sialoadenitis w/ inflammatory change and fluid w/ reactive lymphadenopathy   - S/p Unasyn 3g IV x1, 1L NS x1, percocet 5/325mg x2 doses in ED  - Continue unasyn 3g q6hrs  - Discontinue clindamycin 300mg q8hrs per ID as its coverage it close to Unasyn   - IV Decadron 5mg q8h for 2 days   - Cepacol PRN for odynophagia   - F/u blood cultures  - Continue tylenol 650mg q6hr prn mild pain, percocet 0.5mg q6hr moderate pain, percocet 1.0mg q6hr severe pain  - Pt w/o history of mumps and received MMR vaccine as child. Will order mumps IgG and IgM to verify immunity and r/o mumps as possible cause  - Basic rheum workup - ALESSANDRA, RF, ESR, CRP, ACE to r/o rheumatologic cause  - Continue diet, easy to chew, as tolerated  - ID (Jose) consulted, recs appreciated   - ENT (Gulshan) consulted, will appreciate recs

## 2021-11-08 NOTE — PROGRESS NOTE ADULT - ATTENDING COMMENTS
20 yo F with pmhx significant for a chiari malformation s/p repair 2 years ago, who presents with worsening L sided facial swelling and pain x2 days. Admitted for L parotiditis and sialoadenitis. C/o dysphagia/ left sided hearing problem, pain     - IV Decadron 5mg q8h for 2 days ordered   -Continue Unasyn. D/c Clindamycin per ID   - Cepacol PRN for odynophagia   - F/u blood cultures  - Continue tylenol 650mg q6hr prn mild pain, percocet 0.5mg q6hr moderate pain, percocet 1.0mg q6hr severe pain  - Pt w/o history of mumps and received MMR vaccine as child. Will order mumps IgG and IgM to verify immunity and r/o mumps as possible cause  - Basic rheum workup - ALESSANDRA, RF, ESR, CRP, ACE to r/o rheumatologic cause  - Continue diet, easy to chew, as tolerated  - ID (Jose) consulted, recs appreciated   - ENT (Gulshan) consulted, d/w him over the phone. Recommended warm compresses Q2 hours, massage, IV fluids. He will see her in am.

## 2021-11-08 NOTE — DISCHARGE NOTE PROVIDER - NSDCCPCAREPLAN_GEN_ALL_CORE_FT
PRINCIPAL DISCHARGE DIAGNOSIS  Diagnosis: Acute parotitis  Assessment and Plan of Treatment: You presented with pain and swelling of the left jaw. Your symptoms were caused by parotitis which is swelling of the salivary glands located between the ear and jaw. This condition is typically caused by obstruction of the glands or viral infections. You received antibiotics, steroids, and pain medications and your symptoms improved.   TOMORROW START Augmentin 875 mg every 12 hours, last dose to be completed on 11/16/21  TOMORROW START prednisone 40 mg daily, last day on 11/12/21   Follow-up with your PCP within 1 week for further management

## 2021-11-08 NOTE — PROGRESS NOTE ADULT - TIME BILLING
Patient seen and examined at bedside. Meds, labs, vitals, H&P, consult notes reviewed. Plan d/w Consultants, house staff, RN. D/w Patient all questions answered

## 2021-11-08 NOTE — PATIENT PROFILE ADULT - NSPROEXTENSIONSOFSELF_GEN_A_NUR
Problem: Fluid Volume:  Goal: Will maintain adequate fluid volume  Will maintain adequate fluid volume   Outcome: Ongoing  Pt tolerating fluids by mouth eyeglasses

## 2021-11-08 NOTE — DISCHARGE NOTE PROVIDER - CARE PROVIDER_API CALL
Jose Angel Vogel  [address] [place] [address]   [place]eses [address]  Phone: [phone]  Fax: (   )    -  Established Patient  Follow Up Time: 1 week

## 2021-11-09 ENCOUNTER — TRANSCRIPTION ENCOUNTER (OUTPATIENT)
Age: 21
End: 2021-11-09

## 2021-11-09 VITALS
HEART RATE: 65 BPM | SYSTOLIC BLOOD PRESSURE: 123 MMHG | DIASTOLIC BLOOD PRESSURE: 78 MMHG | OXYGEN SATURATION: 98 % | TEMPERATURE: 98 F | RESPIRATION RATE: 18 BRPM

## 2021-11-09 LAB
ALBUMIN SERPL ELPH-MCNC: 2.7 G/DL — LOW (ref 3.3–5)
ALP SERPL-CCNC: 38 U/L — LOW (ref 40–120)
ALT FLD-CCNC: 20 U/L — SIGNIFICANT CHANGE UP (ref 12–78)
ANION GAP SERPL CALC-SCNC: 6 MMOL/L — SIGNIFICANT CHANGE UP (ref 5–17)
AST SERPL-CCNC: 10 U/L — LOW (ref 15–37)
BILIRUB SERPL-MCNC: 0.3 MG/DL — SIGNIFICANT CHANGE UP (ref 0.2–1.2)
BUN SERPL-MCNC: 6 MG/DL — LOW (ref 7–23)
CALCIUM SERPL-MCNC: 8.5 MG/DL — SIGNIFICANT CHANGE UP (ref 8.5–10.1)
CHLORIDE SERPL-SCNC: 110 MMOL/L — HIGH (ref 96–108)
CO2 SERPL-SCNC: 25 MMOL/L — SIGNIFICANT CHANGE UP (ref 22–31)
COVID-19 NUCLEOCAPSID GAM AB INTERP: POSITIVE
COVID-19 NUCLEOCAPSID TOTAL GAM ANTIBODY RESULT: 1.33 INDEX — HIGH
COVID-19 SPIKE DOMAIN AB INTERP: POSITIVE
COVID-19 SPIKE DOMAIN ANTIBODY RESULT: >250 U/ML — HIGH
CREAT SERPL-MCNC: 0.6 MG/DL — SIGNIFICANT CHANGE UP (ref 0.5–1.3)
GLUCOSE SERPL-MCNC: 138 MG/DL — HIGH (ref 70–99)
HCT VFR BLD CALC: 32.6 % — LOW (ref 34.5–45)
HGB BLD-MCNC: 11.2 G/DL — LOW (ref 11.5–15.5)
MAGNESIUM SERPL-MCNC: 2.2 MG/DL — SIGNIFICANT CHANGE UP (ref 1.6–2.6)
MCHC RBC-ENTMCNC: 32.5 PG — SIGNIFICANT CHANGE UP (ref 27–34)
MCHC RBC-ENTMCNC: 34.4 GM/DL — SIGNIFICANT CHANGE UP (ref 32–36)
MCV RBC AUTO: 94.5 FL — SIGNIFICANT CHANGE UP (ref 80–100)
NRBC # BLD: 0 /100 WBCS — SIGNIFICANT CHANGE UP (ref 0–0)
PHOSPHATE SERPL-MCNC: 3.8 MG/DL — SIGNIFICANT CHANGE UP (ref 2.5–4.5)
PLATELET # BLD AUTO: 209 K/UL — SIGNIFICANT CHANGE UP (ref 150–400)
POTASSIUM SERPL-MCNC: 3.9 MMOL/L — SIGNIFICANT CHANGE UP (ref 3.5–5.3)
POTASSIUM SERPL-SCNC: 3.9 MMOL/L — SIGNIFICANT CHANGE UP (ref 3.5–5.3)
PROT SERPL-MCNC: 6.5 G/DL — SIGNIFICANT CHANGE UP (ref 6–8.3)
RBC # BLD: 3.45 M/UL — LOW (ref 3.8–5.2)
RBC # FLD: 11.5 % — SIGNIFICANT CHANGE UP (ref 10.3–14.5)
SARS-COV-2 IGG+IGM SERPL QL IA: 1.33 INDEX — HIGH
SARS-COV-2 IGG+IGM SERPL QL IA: >250 U/ML — HIGH
SARS-COV-2 IGG+IGM SERPL QL IA: POSITIVE
SARS-COV-2 IGG+IGM SERPL QL IA: POSITIVE
SODIUM SERPL-SCNC: 141 MMOL/L — SIGNIFICANT CHANGE UP (ref 135–145)
WBC # BLD: 11 K/UL — HIGH (ref 3.8–10.5)
WBC # FLD AUTO: 11 K/UL — HIGH (ref 3.8–10.5)

## 2021-11-09 PROCEDURE — 84702 CHORIONIC GONADOTROPIN TEST: CPT

## 2021-11-09 PROCEDURE — 36415 COLL VENOUS BLD VENIPUNCTURE: CPT

## 2021-11-09 PROCEDURE — 84100 ASSAY OF PHOSPHORUS: CPT

## 2021-11-09 PROCEDURE — 85025 COMPLETE CBC W/AUTO DIFF WBC: CPT

## 2021-11-09 PROCEDURE — 85027 COMPLETE CBC AUTOMATED: CPT

## 2021-11-09 PROCEDURE — 86038 ANTINUCLEAR ANTIBODIES: CPT

## 2021-11-09 PROCEDURE — 87389 HIV-1 AG W/HIV-1&-2 AB AG IA: CPT

## 2021-11-09 PROCEDURE — 83735 ASSAY OF MAGNESIUM: CPT

## 2021-11-09 PROCEDURE — 86140 C-REACTIVE PROTEIN: CPT

## 2021-11-09 PROCEDURE — 80053 COMPREHEN METABOLIC PANEL: CPT

## 2021-11-09 PROCEDURE — G1004: CPT

## 2021-11-09 PROCEDURE — 87040 BLOOD CULTURE FOR BACTERIA: CPT

## 2021-11-09 PROCEDURE — 70487 CT MAXILLOFACIAL W/DYE: CPT | Mod: MG

## 2021-11-09 PROCEDURE — 86735 MUMPS ANTIBODY: CPT

## 2021-11-09 PROCEDURE — 86431 RHEUMATOID FACTOR QUANT: CPT

## 2021-11-09 PROCEDURE — 99285 EMERGENCY DEPT VISIT HI MDM: CPT

## 2021-11-09 PROCEDURE — 86769 SARS-COV-2 COVID-19 ANTIBODY: CPT

## 2021-11-09 PROCEDURE — U0005: CPT

## 2021-11-09 PROCEDURE — 99239 HOSP IP/OBS DSCHRG MGMT >30: CPT

## 2021-11-09 PROCEDURE — 99232 SBSQ HOSP IP/OBS MODERATE 35: CPT

## 2021-11-09 PROCEDURE — 83605 ASSAY OF LACTIC ACID: CPT

## 2021-11-09 PROCEDURE — U0003: CPT

## 2021-11-09 PROCEDURE — 90686 IIV4 VACC NO PRSV 0.5 ML IM: CPT

## 2021-11-09 PROCEDURE — 85652 RBC SED RATE AUTOMATED: CPT

## 2021-11-09 RX ORDER — DESOGESTREL AND ETHINYL ESTRADIOL 0.15-0.03
1 KIT ORAL
Qty: 0 | Refills: 0 | DISCHARGE

## 2021-11-09 RX ADMIN — AMPICILLIN SODIUM AND SULBACTAM SODIUM 200 GRAM(S): 250; 125 INJECTION, POWDER, FOR SUSPENSION INTRAMUSCULAR; INTRAVENOUS at 01:45

## 2021-11-09 RX ADMIN — Medication 5 MILLIGRAM(S): at 05:58

## 2021-11-09 RX ADMIN — AMPICILLIN SODIUM AND SULBACTAM SODIUM 200 GRAM(S): 250; 125 INJECTION, POWDER, FOR SUSPENSION INTRAMUSCULAR; INTRAVENOUS at 08:07

## 2021-11-09 RX ADMIN — INFLUENZA VIRUS VACCINE 0.5 MILLILITER(S): 15; 15; 15; 15 SUSPENSION INTRAMUSCULAR at 13:07

## 2021-11-09 NOTE — DISCHARGE NOTE NURSING/CASE MANAGEMENT/SOCIAL WORK - NSDCVIVACCINE_GEN_ALL_CORE_FT
influenza, injectable, quadrivalent, preservative free; 09-Nov-2021 13:07; Kirstin Enrique (RN); [employer]; [id number] (Exp. Date: 30-Jun-2022); IntraMuscular; Deltoid Left.; 0.5 milliLiter(s); VIS (VIS Published: 06-Aug-2021, VIS Presented: 09-Nov-2021);

## 2021-11-09 NOTE — DISCHARGE NOTE NURSING/CASE MANAGEMENT/SOCIAL WORK - PATIENT PORTAL LINK FT
You can access the FollowMyHealth Patient Portal offered by [facility] by registering at the following website: [email or web address]. By joining [facility]’s FollowMyHealth portal, you will also be able to view your health information using other applications (apps) compatible with our system.

## 2021-11-09 NOTE — PROGRESS NOTE ADULT - SUBJECTIVE AND OBJECTIVE BOX
[facility]  INFECTIOUS DISEASES   [address] [place] [address]  Tel: [phone]     Fax: [phone]  ======================================================  MD Robin Staples Kaushal, MD Cho, Michelle, MD   ======================================================    [id number]  BAYRON EMERSON     Follow up: Left parotitis     Significantly improved in terms of swelling and pain.   Feels more soft. No fever. cultures are negative.     PAST MEDICAL & SURGICAL HISTORY:  No pertinent past medical history  Chiari I malformation  s/p repair 2 years ago    Social Hx: No smoking or drugs, drinks socialy     FAMILY HISTORY:  No pertinent family history    Allergies  No Known Allergies    Antibiotics:  Clindamycin   ampicillin/sulbactam   dexamethasone       REVIEW OF SYSTEMS:  CONSTITUTIONAL:  No Fever or chills  HEENT:  No diplopia or blurred vision.  No sore throat or runny nose. Left sided facial swelling and pain  CARDIOVASCULAR:  No chest pain or SOB.  RESPIRATORY:  No cough, shortness of breath, PND or orthopnea.  GASTROINTESTINAL:  No nausea, vomiting or diarrhea.  GENITOURINARY:  No dysuria, frequency or urgency. No Blood in urine  MUSCULOSKELETAL:  no joint aches, no muscle pain  SKIN:  No change in skin, hair or nails.  NEUROLOGIC:  No paresthesias, fasciculations, seizures or weakness.  PSYCHIATRIC:  No disorder of thought or mood.  ENDOCRINE:  No heat or cold intolerance, polyuria or polydipsia.  HEMATOLOGICAL:  No easy bruising or bleeding.     Physical Exam:  Vital Signs Last 24 Hrs  T(C): 36.7 (09 Nov 2021 04:15), Max: 36.9 (08 Nov 2021 12:41)  T(F): 98 (09 Nov 2021 04:15), Max: 98.4 (08 Nov 2021 12:41)  HR: 81 (09 Nov 2021 04:15) (81 - 86)  BP: 110/68 (09 Nov 2021 04:15) (107/69 - 144/81)  BP(mean): --  RR: 18 (09 Nov 2021 04:15) (18 - 18)  SpO2: 95% (09 Nov 2021 04:15) (95% - 96%)  GEN: NAD  HEENT: normocephalic and atraumatic. EOMI. PERRL. Left parotid gland swelling and tenderness, left sided neck and face swelling  NECK: Supple.  No lymphadenopathy   LUNGS: Clear to auscultation.  HEART: Regular rate and rhythm without murmur.  ABDOMEN: Soft, nontender, and nondistended.  Positive bowel sounds.    : No CVA tenderness  EXTREMITIES: Without any cyanosis, clubbing, rash, lesions or edema.  NEUROLOGIC: grossly intact.  PSYCHIATRIC: Appropriate affect .  SKIN: No ulceration or induration present.    Labs:                        11.2   11.00 )-----------( 209      ( 09 Nov 2021 08:08 )             32.6     11-09    141  |  110<H>  |  6<L>  ----------------------------<  138<H>  3.9   |  25  |  0.60    Ca    8.5      09 Nov 2021 08:08  Phos  3.8     11-09  Mg     2.2     11-09    TPro  6.5  /  Alb  2.7<L>  /  TBili  0.3  /  DBili  x   /  AST  10<L>  /  ALT  20  /  AlkPhos  38<L>  11-09    Culture - Blood (collected 11-07-21 @ 23:02)  Source: .Blood Blood-Peripheral    Culture - Blood (collected 11-07-21 @ 23:02)  Source: .Blood Blood-Peripheral    WBC Count: 11.00 K/uL (11-09-21 @ 08:08)  WBC Count: 8.36 K/uL (11-08-21 @ 10:22)  WBC Count: 15.23 K/uL (11-07-21 @ 19:32)    Creatinine, Serum: 0.60 mg/dL (11-09-21 @ 08:08)  Creatinine, Serum: 0.69 mg/dL (11-08-21 @ 10:22)  Creatinine, Serum: 0.79 mg/dL (11-07-21 @ 19:32)    C-Reactive Protein, Serum: 61 mg/L (11-08-21 @ 15:19)    Sedimentation Rate, Erythrocyte: 16 mm/hr (11-08-21 @ 11:55)    COVID-19 PCR: NotDetec (11-07-21 @ 19:37)    All imaging and other data have been reviewed.  < from: CT Maxillofacial w/ IV Cont (11.07.21 @ 21:03) >  IMPRESSION:  Left-sided parotiditis and to a lesser degree left sided submandibular gland sialoadenitis with marked surrounding   inflammatory change and fluid with reactive lymphadenopathy. No discrete rim-enhancing collection to suggest abscess.    Assessment and Plan:   20 yo woman with PMH of Chiari malformation s/p repair 2 years ago, was admitted on 11/7 with Left sided facial swelling and pain since 11/6.   No mumps as a kid. Vaccines completed as a kid.  Never had Sialoadenitis in the past. This could be salivary stone or thick saliva or even stenosis or salivary canals causing swelling in glands and possibly 2' superinfection with bacteria.   For this reason will cover mouth david at this time.   Also Will send mumps serology to document if she has IgG. Sometimes mump like viral infection also causes sialoadenitis.   In some cases Rheumatologic disorders could cause This type of presentation. If recurs will need more work up to rule out autoimmune and Rheum disorders.   CT with swelling or Left parotid and submandibular glands, no abscess   Leukocytosis 15k     Left sided sialoadenitis    Recommendations:   - Blood culture x 2 NGTD  - Mumps IgG positive, old infection   - HIV test negative   - ENT consult noted   - Can switch to oral augmentin 875mg q12 to complete total one week    Will sign off please call with any question.   Discussed with the primary team.     Randy Garcia MD  Division of Infectious Diseases   Cell [phone] between 8am and 6pm   After 6pm and weekends please call ID service at [phone].   
Patient is a 21y old  Female who presents with a chief complaint of facial swelling (08 Nov 2021 09:39)      INTERVAL HPI/OVERNIGHT EVENTS: No overnight events. Patient seen and examined at bedside. Patient resting comfortably in bed. Continues to endorse L sided facial pain and swelling extending from L temporal region to neck. Also has associated muffled hearing on left side and odynophagia. Denies fevers, chills, headache, lightheadedness, chest pain, dyspnea, abdominal pain, nausea, vomiting, diarrhea, constipation.      MEDICATIONS  (STANDING):  ampicillin/sulbactam  IVPB 3 Gram(s) IV Intermittent every 6 hours  dexAMETHasone  Injectable 5 milliGRAM(s) IV Push every 8 hours  influenza   Vaccine 0.5 milliLiter(s) IntraMuscular once  sodium chloride 0.9%. 1000 milliLiter(s) (75 mL/Hr) IV Continuous <Continuous>    MEDICATIONS  (PRN):  acetaminophen     Tablet .. 650 milliGRAM(s) Oral every 6 hours PRN Temp greater or equal to 38C (100.4F), Mild Pain (1 - 3)  aluminum hydroxide/magnesium hydroxide/simethicone Suspension 30 milliLiter(s) Oral every 4 hours PRN Dyspepsia  benzocaine 15 mG/menthol 3.6 mG (Sugar-Free) Lozenge 1 Lozenge Oral every 4 hours PRN Sore Throat  ibuprofen  Tablet. 600 milliGRAM(s) Oral every 8 hours PRN Moderate Pain (4 - 6)  melatonin 3 milliGRAM(s) Oral at bedtime PRN Insomnia  ondansetron Injectable 4 milliGRAM(s) IV Push every 8 hours PRN Nausea and/or Vomiting  oxycodone    5 mG/acetaminophen 325 mG 0.5 Tablet(s) Oral every 4 hours PRN Moderate Pain (4 - 6)  oxycodone    5 mG/acetaminophen 325 mG 1 Tablet(s) Oral every 6 hours PRN Severe Pain (7 - 10)      Allergies    No Known Allergies    Intolerances        REVIEW OF SYSTEMS:  CONSTITUTIONAL: No fever or chills  HEENT: + L FACIAL PAIN AND SWELLING, L MUFFLED HEARING, ODYNOPHAGIA  RESPIRATORY: No cough, wheezing, or shortness of breath  CARDIOVASCULAR: No chest pain, palpitations  GASTROINTESTINAL: No abd pain, nausea, vomiting, or diarrhea  GENITOURINARY: No dysuria, frequency, or hematuria  NEUROLOGICAL: no focal weakness or dizziness  MUSCULOSKELETAL: no myalgias     Vital Signs Last 24 Hrs  T(C): 36.8 (08 Nov 2021 05:38), Max: 37.1 (07 Nov 2021 23:24)  T(F): 98.2 (08 Nov 2021 05:38), Max: 98.7 (07 Nov 2021 23:24)  HR: 69 (08 Nov 2021 05:38) (62 - 86)  BP: 117/66 (08 Nov 2021 05:38) (107/58 - 132/80)  BP(mean): --  RR: 18 (08 Nov 2021 05:38) (18 - 18)  SpO2: 96% (08 Nov 2021 05:38) (96% - 99%)    PHYSICAL EXAM:  GENERAL: not in acute distress at rest   HEENT:  anicteric, moist mucous membranes, normal oropharynx, L sided facial swelling, firmness and TTP extending from L temporal region to neck   CHEST/LUNG:  CTA b/l, no rales, wheezes, or rhonchi  HEART:  RRR, S1, S2  ABDOMEN:  BS+, soft, nontender, nondistended  EXTREMITIES: no edema, cyanosis, or calf tenderness  NERVOUS SYSTEM: answers questions and follows commands appropriately    LABS:                        11.7   8.36  )-----------( 187      ( 08 Nov 2021 10:22 )             34.1     CBC Full  -  ( 08 Nov 2021 10:22 )  WBC Count : 8.36 K/uL  Hemoglobin : 11.7 g/dL  Hematocrit : 34.1 %  Platelet Count - Automated : 187 K/uL  Mean Cell Volume : 94.5 fl  Mean Cell Hemoglobin : 32.4 pg  Mean Cell Hemoglobin Concentration : 34.3 gm/dL  Auto Neutrophil # : 6.94 K/uL  Auto Lymphocyte # : 0.78 K/uL  Auto Monocyte # : 0.54 K/uL  Auto Eosinophil # : 0.04 K/uL  Auto Basophil # : 0.02 K/uL  Auto Neutrophil % : 83.0 %  Auto Lymphocyte % : 9.3 %  Auto Monocyte % : 6.5 %  Auto Eosinophil % : 0.5 %  Auto Basophil % : 0.2 %    07 Nov 2021 19:32    136    |  105    |  9      ----------------------------<  99     4.3     |  24     |  0.79     Ca    8.9        07 Nov 2021 19:32    TPro  7.9    /  Alb  3.5    /  TBili  0.5    /  DBili  x      /  AST  36     /  ALT  29     /  AlkPhos  40     07 Nov 2021 19:32        CAPILLARY BLOOD GLUCOSE              RADIOLOGY & ADDITIONAL TESTS:    Personally reviewed.     Consultant(s) Notes Reviewed:  [x] YES  [ ] NO

## 2021-11-09 NOTE — CONSULT NOTE ADULT - SUBJECTIVE AND OBJECTIVE BOX
[facility]  INFECTIOUS DISEASES   [address] [place] [address]  Tel: [phone]     Fax: [phone]  ======================================================  MD Robin Staples Kaushal, MD Cho, Michelle, MD   ======================================================    [id number]  BAYRON EMERSON     CC: Left facial swelling     HPI:  22 yo woman with PMH of Chiari malformation s/p repair 2 years ago, was admitted on 11/7 with Left sided facial swelling and pain since 11/6. The pain radiates to her left side of head and left side of neck and shoulder. NO sore throat or dysphagia, no GI symptoms.   Also endorses having been flying from [place] -> [place] -> [place] all day on admission day. No trauma to the area, any dental work or infection as far as she knows. Never had Mumps in the past, no sialoadenitis in the past.   No COVID in the past, fully vaccinated for COVID, denies recent sick contacts, including her boyfriend who she was traveling with.   She had many ear infections throughout lifetime, with last being July 2020. The patient states around age 3 she had b/l ear tube placement.     PAST MEDICAL & SURGICAL HISTORY:  No pertinent past medical history  Chiari I malformation  s/p repair 2 years ago    Social Hx: No smoking or drugs, drinks socialy     FAMILY HISTORY:  No pertinent family history    Allergies  No Known Allergies    Antibiotics:  Clindamycin   ampicillin/sulbactam   dexamethasone       REVIEW OF SYSTEMS:  CONSTITUTIONAL:  No Fever or chills  HEENT:  No diplopia or blurred vision.  No sore throat or runny nose. Left sided facial swelling and pain  CARDIOVASCULAR:  No chest pain or SOB.  RESPIRATORY:  No cough, shortness of breath, PND or orthopnea.  GASTROINTESTINAL:  No nausea, vomiting or diarrhea.  GENITOURINARY:  No dysuria, frequency or urgency. No Blood in urine  MUSCULOSKELETAL:  no joint aches, no muscle pain  SKIN:  No change in skin, hair or nails.  NEUROLOGIC:  No paresthesias, fasciculations, seizures or weakness.  PSYCHIATRIC:  No disorder of thought or mood.  ENDOCRINE:  No heat or cold intolerance, polyuria or polydipsia.  HEMATOLOGICAL:  No easy bruising or bleeding.     Physical Exam:  Vital Signs Last 24 Hrs  T(C): 36.8 (08 Nov 2021 05:38), Max: 37.1 (07 Nov 2021 23:24)  T(F): 98.2 (08 Nov 2021 05:38), Max: 98.7 (07 Nov 2021 23:24)  HR: 69 (08 Nov 2021 05:38) (62 - 86)  BP: 117/66 (08 Nov 2021 05:38) (107/58 - 132/80)  RR: 18 (08 Nov 2021 05:38) (18 - 18)  SpO2: 96% (08 Nov 2021 05:38) (96% - 99%)  Height (cm): 167.6 (11-07 @ 18:42)  Weight (kg): 66.6 (11-08 @ 05:38)  BMI (kg/m2): 23.7 (11-08 @ 05:38)  BSA (m2): 1.75 (11-08 @ 05:38)  GEN: NAD  HEENT: normocephalic and atraumatic. EOMI. PERRL. Left parotid gland swelling and tenderness, left sided neck and face swelling  NECK: Supple.  No lymphadenopathy   LUNGS: Clear to auscultation.  HEART: Regular rate and rhythm without murmur.  ABDOMEN: Soft, nontender, and nondistended.  Positive bowel sounds.    : No CVA tenderness  EXTREMITIES: Without any cyanosis, clubbing, rash, lesions or edema.  NEUROLOGIC: grossly intact.  PSYCHIATRIC: Appropriate affect .  SKIN: No ulceration or induration present.    Labs:  11-07    136  |  105  |  9   ----------------------------<  99  4.3   |  24  |  0.79    Ca    8.9      07 Nov 2021 19:32    TPro  7.9  /  Alb  3.5  /  TBili  0.5  /  DBili  x   /  AST  36  /  ALT  29  /  AlkPhos  40  11-07                        12.9   15.23 )-----------( 262      ( 07 Nov 2021 19:32 )             37.0     LIVER FUNCTIONS - ( 07 Nov 2021 19:32 )  Alb: 3.5 g/dL / Pro: 7.9 g/dL / ALK PHOS: 40 U/L / ALT: 29 U/L / AST: 36 U/L / GGT: x           COVID-19 PCR: NotDetec (11-07-21 @ 19:37)    All imaging and other data have been reviewed.  < from: CT Maxillofacial w/ IV Cont (11.07.21 @ 21:03) >  IMPRESSION:  Left-sided parotiditis and to a lesser degree left sided submandibular gland sialoadenitis with marked surrounding   inflammatory change and fluid with reactive lymphadenopathy. No discrete rim-enhancing collection to suggest abscess.    Assessment and Plan:   22 yo woman with PMH of Chiari malformation s/p repair 2 years ago, was admitted on 11/7 with Left sided facial swelling and pain since 11/6.   No mumps as a kid. Vaccines completed as a kid.  Never had Sialoadenitis in the past. This could be salivary stone or thick saliva or even stenosis or salivary canals causing swelling in glands and possibly 2' superinfection with bacteria.   For this reason will cover mouth david at this time.   Also Will send mumps serology to document if she has IgG. Sometimes mump like viral infection also causes sialoadenitis.   In some cases Rheumatologic disorders could cause This type of presentation. If recurs will need more work up to rule out autoimmune and Rheum disorders.   CT with swelling or Left parotid and submandibular glands, no abscess   Leukocytosis 15k     Left sided sialoadenitis    Recommendations:   - Blood culture x 2   - Mumps IgG and IgM  - HIV test   - ALESSANDRA, RF, ESR and CRP, ACE for the start   - ENT consult if no resolution   - Can continue Unasyn that covers mouth david   - Will stop clindamycin, its coverage is close to Unasyn     Thank you for courtesy of this consult.     Will follow.  Discussed with the primary team.     Randy Garcia MD  Division of Infectious Diseases   Cell [phone] between 8am and 6pm   After 6pm and weekends please call ID service at [phone]. 
21 year old female with left sided facial swelling. She notes the swelling was worse over the weekend and came to ED at [facility].   She was admitted and started on IV Unasyn and her symptoms improved. She notes the pain is not as severe and the swelling resolved.  She had a CT-STN that confirmed left parotitis.    Allergies: NKDA  Meds: OC  PMH: none  Surgery: Chiari repair  SH: Non smoker, Social EToh  FAM: IPF, Thyroid    ROS: 10 point neg    PE: AFVSS  EArs TM intact  mild tenderness to left parotid bed, swelling improved  Neck: supple    Impression: Left parotitis    Plan: D/C with oral Abx, increase PO hydration

## 2021-11-10 LAB — ANA TITR SER: NEGATIVE — SIGNIFICANT CHANGE UP

## 2021-11-11 LAB — MUV IGM FLD QL IA: <0.8 AU — SIGNIFICANT CHANGE UP (ref 0–0.79)

## 2021-11-13 LAB
CULTURE RESULTS: SIGNIFICANT CHANGE UP
CULTURE RESULTS: SIGNIFICANT CHANGE UP
SPECIMEN SOURCE: SIGNIFICANT CHANGE UP
SPECIMEN SOURCE: SIGNIFICANT CHANGE UP

## 2022-05-22 ENCOUNTER — NON-APPOINTMENT (OUTPATIENT)
Age: 22
End: 2022-05-22

## 2022-07-12 ENCOUNTER — NON-APPOINTMENT (OUTPATIENT)
Age: 22
End: 2022-07-12

## 2022-08-08 ENCOUNTER — NON-APPOINTMENT (OUTPATIENT)
Age: 22
End: 2022-08-08

## 2022-08-08 PROBLEM — Z78.9 OTHER SPECIFIED HEALTH STATUS: Chronic | Status: ACTIVE | Noted: 2021-11-07

## 2022-09-20 ENCOUNTER — APPOINTMENT (OUTPATIENT)
Dept: FAMILY MEDICINE | Facility: CLINIC | Age: 22
End: 2022-09-20

## 2022-09-20 VITALS — DIASTOLIC BLOOD PRESSURE: 72 MMHG | SYSTOLIC BLOOD PRESSURE: 118 MMHG

## 2022-09-20 VITALS
HEIGHT: 66 IN | BODY MASS INDEX: 23.3 KG/M2 | WEIGHT: 145 LBS | OXYGEN SATURATION: 99 % | HEART RATE: 46 BPM | SYSTOLIC BLOOD PRESSURE: 136 MMHG | TEMPERATURE: 97.8 F | DIASTOLIC BLOOD PRESSURE: 85 MMHG

## 2022-09-20 DIAGNOSIS — Z78.9 OTHER SPECIFIED HEALTH STATUS: ICD-10-CM

## 2022-09-20 DIAGNOSIS — Z98.890 OTHER SPECIFIED POSTPROCEDURAL STATES: ICD-10-CM

## 2022-09-20 DIAGNOSIS — Z00.00 ENCOUNTER FOR GENERAL ADULT MEDICAL EXAMINATION W/OUT ABNORMAL FINDINGS: ICD-10-CM

## 2022-09-20 DIAGNOSIS — Z83.49 FAMILY HISTORY OF OTHER ENDOCRINE, NUTRITIONAL AND METABOLIC DISEASES: ICD-10-CM

## 2022-09-20 DIAGNOSIS — F40.243 FEAR OF FLYING: ICD-10-CM

## 2022-09-20 DIAGNOSIS — Z80.3 FAMILY HISTORY OF MALIGNANT NEOPLASM OF BREAST: ICD-10-CM

## 2022-09-20 DIAGNOSIS — Z86.69 PERSONAL HISTORY OF OTHER DISEASES OF THE NERVOUS SYSTEM AND SENSE ORGANS: ICD-10-CM

## 2022-09-20 PROCEDURE — 36415 COLL VENOUS BLD VENIPUNCTURE: CPT

## 2022-09-20 PROCEDURE — 99385 PREV VISIT NEW AGE 18-39: CPT | Mod: 25

## 2022-09-20 PROCEDURE — 99214 OFFICE O/P EST MOD 30 MIN: CPT | Mod: 25

## 2022-09-20 RX ORDER — ALPRAZOLAM 0.5 MG/1
0.5 TABLET, ORALLY DISINTEGRATING ORAL
Qty: 10 | Refills: 0 | Status: ACTIVE | COMMUNITY
Start: 2022-09-20 | End: 1900-01-01

## 2022-09-20 NOTE — HISTORY OF PRESENT ILLNESS
[FreeTextEntry1] : establish care  [de-identified] : 21 yo f presents to establish care. \par Works for [employer]. \par No complaints today. \par Hx of flight anxiety, had xanax in the past for this. Interested in a refill. \par

## 2022-09-20 NOTE — HEALTH RISK ASSESSMENT
[Good] : ~his/her~  mood as  good [Never] : Never [Yes] : Yes [2 - 4 times a month (2 pts)] : 2-4 times a month (2 points) [1 or 2 (0 pts)] : 1 or 2 (0 points) [Never (0 pts)] : Never (0 points) [No] : In the past 12 months have you used drugs other than those required for medical reasons? No [0] : 2) Feeling down, depressed, or hopeless: Not at all (0) [PHQ-2 Negative - No further assessment needed] : PHQ-2 Negative - No further assessment needed [Audit-CScore] : 2 [de-identified] : exercises-- running and lifting  [de-identified] : fruits, veggies  [OMN2Maqmy] : 0 [Patient reported PAP Smear was normal] : Patient reported PAP Smear was normal [HIV test declined] : HIV test declined [Hepatitis C test declined] : Hepatitis C test declined [Change in mental status noted] : No change in mental status noted [Language] : denies difficulty with language [None] : None [With Significant Other] : lives with significant other [Employed] : employed [Significant Other] : lives with significant other [Sexually Active] : sexually active [High Risk Behavior] : no high risk behavior [Feels Safe at Home] : Feels safe at home [Fully functional (bathing, dressing, toileting, transferring, walking, feeding)] : Fully functional (bathing, dressing, toileting, transferring, walking, feeding) [Fully functional (using the telephone, shopping, preparing meals, housekeeping, doing laundry, using] : Fully functional and needs no help or supervision to perform IADLs (using the telephone, shopping, preparing meals, housekeeping, doing laundry, using transportation, managing medications and managing finances) [Reports changes in hearing] : Reports no changes in hearing [Reports changes in vision] : Reports no changes in vision [PapSmearDate] : 03/22 [FreeTextEntry2] :  for sports med [facility]  [de-identified] : m

## 2022-09-20 NOTE — PLAN
[FreeTextEntry1] : follow up labs, labs drawn in office \par \par anxiety with flying \par has taken xanax in the past \par reviewed risks, benefits, alternatives, side effects, regimen \par aware not to mix with alcohol or drugs \par admits only use when flying\par

## 2022-09-22 ENCOUNTER — NON-APPOINTMENT (OUTPATIENT)
Age: 22
End: 2022-09-22

## 2022-09-27 LAB
25(OH)D3 SERPL-MCNC: 50.1 NG/ML
ALBUMIN SERPL ELPH-MCNC: 4.4 G/DL
ALP BLD-CCNC: 32 U/L
ALT SERPL-CCNC: 15 U/L
ANION GAP SERPL CALC-SCNC: 12 MMOL/L
AST SERPL-CCNC: 15 U/L
BASOPHILS # BLD AUTO: 0.02 K/UL
BASOPHILS NFR BLD AUTO: 0.6 %
BILIRUB SERPL-MCNC: 0.3 MG/DL
BUN SERPL-MCNC: 9 MG/DL
CALCIUM SERPL-MCNC: 9.7 MG/DL
CHLORIDE SERPL-SCNC: 104 MMOL/L
CHOLEST SERPL-MCNC: 184 MG/DL
CO2 SERPL-SCNC: 25 MMOL/L
CREAT SERPL-MCNC: 0.78 MG/DL
EGFR: 109 ML/MIN/1.73M2
EOSINOPHIL # BLD AUTO: 0.04 K/UL
EOSINOPHIL NFR BLD AUTO: 1.3 %
ESTIMATED AVERAGE GLUCOSE: 108 MG/DL
GLUCOSE SERPL-MCNC: 95 MG/DL
HBA1C MFR BLD HPLC: 5.4 %
HCT VFR BLD CALC: 41.3 %
HDLC SERPL-MCNC: 71 MG/DL
HGB BLD-MCNC: 13.3 G/DL
IMM GRANULOCYTES NFR BLD AUTO: 0 %
LDLC SERPL CALC-MCNC: 96 MG/DL
LYMPHOCYTES # BLD AUTO: 1.39 K/UL
LYMPHOCYTES NFR BLD AUTO: 43.7 %
MAN DIFF?: NORMAL
MCHC RBC-ENTMCNC: 32.2 GM/DL
MCHC RBC-ENTMCNC: 33.4 PG
MCV RBC AUTO: 103.8 FL
MONOCYTES # BLD AUTO: 0.26 K/UL
MONOCYTES NFR BLD AUTO: 8.2 %
NEUTROPHILS # BLD AUTO: 1.47 K/UL
NEUTROPHILS NFR BLD AUTO: 46.2 %
NONHDLC SERPL-MCNC: 113 MG/DL
PLATELET # BLD AUTO: 216 K/UL
POTASSIUM SERPL-SCNC: 4.1 MMOL/L
PROT SERPL-MCNC: 6.9 G/DL
RBC # BLD: 3.98 M/UL
RBC # FLD: 13.2 %
SODIUM SERPL-SCNC: 141 MMOL/L
TRIGL SERPL-MCNC: 88 MG/DL
TSH SERPL-ACNC: 0.94 UIU/ML
WBC # FLD AUTO: 3.18 K/UL

## 2022-10-04 ENCOUNTER — NON-APPOINTMENT (OUTPATIENT)
Age: 22
End: 2022-10-04

## 2022-10-04 ENCOUNTER — TRANSCRIPTION ENCOUNTER (OUTPATIENT)
Age: 22
End: 2022-10-04

## 2022-10-08 ENCOUNTER — NON-APPOINTMENT (OUTPATIENT)
Age: 22
End: 2022-10-08

## 2022-10-11 ENCOUNTER — APPOINTMENT (OUTPATIENT)
Dept: FAMILY MEDICINE | Facility: CLINIC | Age: 22
End: 2022-10-11

## 2022-10-11 VITALS
SYSTOLIC BLOOD PRESSURE: 125 MMHG | OXYGEN SATURATION: 100 % | DIASTOLIC BLOOD PRESSURE: 77 MMHG | HEIGHT: 66 IN | WEIGHT: 140 LBS | HEART RATE: 50 BPM | TEMPERATURE: 97.5 F | BODY MASS INDEX: 22.5 KG/M2

## 2022-10-11 DIAGNOSIS — R21 RASH AND OTHER NONSPECIFIC SKIN ERUPTION: ICD-10-CM

## 2022-10-11 PROCEDURE — 99214 OFFICE O/P EST MOD 30 MIN: CPT | Mod: 25

## 2022-10-11 RX ORDER — METHYLPREDNISOLONE 4 MG/1
4 TABLET ORAL
Qty: 1 | Refills: 0 | Status: ACTIVE | COMMUNITY
Start: 2022-10-11 | End: 1900-01-01

## 2022-10-24 ENCOUNTER — APPOINTMENT (OUTPATIENT)
Dept: DERMATOLOGY | Facility: CLINIC | Age: 22
End: 2022-10-24

## 2022-10-24 DIAGNOSIS — L50.3 DERMATOGRAPHIC URTICARIA: ICD-10-CM

## 2022-10-24 DIAGNOSIS — L30.9 DERMATITIS, UNSPECIFIED: ICD-10-CM

## 2022-10-24 PROCEDURE — 99204 OFFICE O/P NEW MOD 45 MIN: CPT

## 2022-10-24 RX ORDER — CETIRIZINE HYDROCHLORIDE 10 MG/1
10 TABLET, COATED ORAL
Qty: 60 | Refills: 2 | Status: ACTIVE | COMMUNITY
Start: 2022-10-24 | End: 1900-01-01

## 2022-10-24 RX ORDER — TRIAMCINOLONE ACETONIDE 1 MG/G
0.1 CREAM TOPICAL
Qty: 1 | Refills: 0 | Status: ACTIVE | COMMUNITY
Start: 2022-10-24 | End: 1900-01-01

## 2022-10-24 NOTE — PHYSICAL EXAM
[Alert] : alert [Oriented x 3] : ~L oriented x 3 [Well Nourished] : well nourished [Conjunctiva Non-injected] : conjunctiva non-injected [No Visual Lymphadenopathy] : no visual  lymphadenopathy [No Clubbing] : no clubbing [No Edema] : no edema [No Bromhidrosis] : no bromhidrosis [No Chromhidrosis] : no chromhidrosis [FreeTextEntry3] : excoriated small ill defined skin colored to light pink papules extremities, excoriations and linear purpura\par \par positive DG

## 2022-10-24 NOTE — HISTORY OF PRESENT ILLNESS
[FreeTextEntry1] : rash  [de-identified] : 3 weeks \par getting more, not moving around\par cream from urgent care did not help\par went away w oral steroid pack but came back when finished 8 days ago, very itchy\par otherwise feels fine \par changed from garcia to tide to plant based when rash started \par uses eucerin moisturizer, not using anything now though\par uses bath and body works soap\par \par meds - ocp x yrs, no supplements\par uses fragrance on neck \par \par works for [employer], admin\par

## 2022-10-25 ENCOUNTER — TRANSCRIPTION ENCOUNTER (OUTPATIENT)
Age: 22
End: 2022-10-25

## 2022-10-31 ENCOUNTER — NON-APPOINTMENT (OUTPATIENT)
Age: 22
End: 2022-10-31

## 2022-10-31 NOTE — PLAN
[FreeTextEntry1] : no improvement since urgent care\par supportive care encouraged \par cream as needed \par encouraged use of cream as needed \par benadryl before bed\par reviewed risks, benefits, side effects, alternatives, regimen \par will take in AM \par will take acid reducer as needed\par

## 2022-10-31 NOTE — PHYSICAL EXAM
[Normal Sclera/Conjunctiva] : normal sclera/conjunctiva [Normal Outer Ear/Nose] : the outer ears and nose were normal in appearance [Normal] : affect was normal and insight and judgment were intact [de-identified] : red bumps scattered on body

## 2022-10-31 NOTE — HISTORY OF PRESENT ILLNESS
[FreeTextEntry8] : cc: rash \par \par 21 yo f presents with rash on her entire body. \par Red, bumpy, itchy. \par Was seen in urgent care and told to try a cream-- no relief from cream at all.

## 2022-11-01 ENCOUNTER — TRANSCRIPTION ENCOUNTER (OUTPATIENT)
Age: 22
End: 2022-11-01

## 2022-12-06 ENCOUNTER — APPOINTMENT (OUTPATIENT)
Dept: FAMILY MEDICINE | Facility: CLINIC | Age: 22
End: 2022-12-06

## 2022-12-06 VITALS
RESPIRATION RATE: 16 BRPM | HEART RATE: 48 BPM | SYSTOLIC BLOOD PRESSURE: 123 MMHG | DIASTOLIC BLOOD PRESSURE: 85 MMHG | BODY MASS INDEX: 23.32 KG/M2 | HEIGHT: 65 IN | WEIGHT: 140 LBS | TEMPERATURE: 97.5 F

## 2022-12-06 DIAGNOSIS — R79.89 OTHER SPECIFIED ABNORMAL FINDINGS OF BLOOD CHEMISTRY: ICD-10-CM

## 2022-12-06 PROCEDURE — 36415 COLL VENOUS BLD VENIPUNCTURE: CPT

## 2022-12-06 PROCEDURE — 99213 OFFICE O/P EST LOW 20 MIN: CPT | Mod: 25

## 2022-12-06 NOTE — HISTORY OF PRESENT ILLNESS
[FreeTextEntry1] : abnormal cbc  [de-identified] : 21 yo f presents to repeat cbc. \par Was abnormal during cpe, likely related to rash.\par Here to repeat it.

## 2022-12-12 LAB
BASOPHILS # BLD AUTO: 0.03 K/UL
BASOPHILS NFR BLD AUTO: 0.5 %
EOSINOPHIL # BLD AUTO: 0.04 K/UL
EOSINOPHIL NFR BLD AUTO: 0.7 %
HCT VFR BLD CALC: 42.3 %
HGB BLD-MCNC: 13.7 G/DL
IMM GRANULOCYTES NFR BLD AUTO: 0.2 %
LYMPHOCYTES # BLD AUTO: 1.47 K/UL
LYMPHOCYTES NFR BLD AUTO: 26.7 %
MAN DIFF?: NORMAL
MCHC RBC-ENTMCNC: 32.4 GM/DL
MCHC RBC-ENTMCNC: 33 PG
MCV RBC AUTO: 101.9 FL
MONOCYTES # BLD AUTO: 0.35 K/UL
MONOCYTES NFR BLD AUTO: 6.4 %
NEUTROPHILS # BLD AUTO: 3.61 K/UL
NEUTROPHILS NFR BLD AUTO: 65.5 %
PLATELET # BLD AUTO: 240 K/UL
RBC # BLD: 4.15 M/UL
RBC # FLD: 12.9 %
WBC # FLD AUTO: 5.51 K/UL

## 2022-12-15 ENCOUNTER — NON-APPOINTMENT (OUTPATIENT)
Age: 22
End: 2022-12-15

## 2023-12-08 ENCOUNTER — APPOINTMENT (OUTPATIENT)
Dept: OBGYN | Facility: CLINIC | Age: 23
End: 2023-12-08
Payer: COMMERCIAL

## 2023-12-08 ENCOUNTER — NON-APPOINTMENT (OUTPATIENT)
Age: 23
End: 2023-12-08

## 2023-12-08 VITALS
WEIGHT: 145.5 LBS | DIASTOLIC BLOOD PRESSURE: 78 MMHG | HEIGHT: 65 IN | BODY MASS INDEX: 24.24 KG/M2 | SYSTOLIC BLOOD PRESSURE: 121 MMHG | HEART RATE: 49 BPM

## 2023-12-08 DIAGNOSIS — Z11.3 ENCOUNTER FOR SCREENING FOR INFECTIONS WITH A PREDOMINANTLY SEXUAL MODE OF TRANSMISSION: ICD-10-CM

## 2023-12-08 DIAGNOSIS — Z30.41 ENCOUNTER FOR SURVEILLANCE OF CONTRACEPTIVE PILLS: ICD-10-CM

## 2023-12-08 DIAGNOSIS — Z01.419 ENCOUNTER FOR GYNECOLOGICAL EXAMINATION (GENERAL) (ROUTINE) W/OUT ABNORMAL FINDINGS: ICD-10-CM

## 2023-12-08 PROCEDURE — 99385 PREV VISIT NEW AGE 18-39: CPT

## 2023-12-08 RX ORDER — DESOGESTREL AND ETHINYL ESTRADIOL 0.15-0.03
0.15-3 KIT ORAL
Qty: 84 | Refills: 3 | Status: ACTIVE | COMMUNITY
Start: 2023-12-08 | End: 1900-01-01

## 2023-12-10 LAB
C TRACH RRNA SPEC QL NAA+PROBE: NOT DETECTED
N GONORRHOEA RRNA SPEC QL NAA+PROBE: NOT DETECTED
SOURCE TP AMPLIFICATION: NORMAL

## 2023-12-14 LAB — CYTOLOGY CVX/VAG DOC THIN PREP: ABNORMAL

## 2023-12-31 PROBLEM — Z11.3 ENCOUNTER FOR SCREENING EXAMINATION FOR SEXUALLY TRANSMITTED DISEASE: Status: RESOLVED | Noted: 2023-12-08 | Resolved: 2023-12-22

## 2024-04-05 ENCOUNTER — APPOINTMENT (OUTPATIENT)
Dept: ORTHOPEDIC SURGERY | Facility: CLINIC | Age: 24
End: 2024-04-05
Payer: COMMERCIAL

## 2024-04-05 DIAGNOSIS — M65.4 RADIAL STYLOID TENOSYNOVITIS [DE QUERVAIN]: ICD-10-CM

## 2024-04-05 PROCEDURE — 20550 NJX 1 TENDON SHEATH/LIGAMENT: CPT | Mod: RT

## 2024-04-05 PROCEDURE — 99204 OFFICE O/P NEW MOD 45 MIN: CPT | Mod: 25

## 2024-10-09 ENCOUNTER — NON-APPOINTMENT (OUTPATIENT)
Age: 24
End: 2024-10-09

## 2024-10-29 ENCOUNTER — APPOINTMENT (OUTPATIENT)
Dept: ORTHOPEDIC SURGERY | Facility: CLINIC | Age: 24
End: 2024-10-29
Payer: COMMERCIAL

## 2024-10-29 VITALS — WEIGHT: 140 LBS | BODY MASS INDEX: 23.32 KG/M2 | HEIGHT: 65 IN

## 2024-10-29 DIAGNOSIS — M65.4 RADIAL STYLOID TENOSYNOVITIS [DE QUERVAIN]: ICD-10-CM

## 2024-10-29 PROCEDURE — 99204 OFFICE O/P NEW MOD 45 MIN: CPT | Mod: 25

## 2024-10-29 PROCEDURE — 20550 NJX 1 TENDON SHEATH/LIGAMENT: CPT | Mod: RT

## 2024-12-02 ENCOUNTER — APPOINTMENT (OUTPATIENT)
Dept: FAMILY MEDICINE | Facility: CLINIC | Age: 24
End: 2024-12-02
Payer: COMMERCIAL

## 2024-12-02 VITALS
HEART RATE: 64 BPM | TEMPERATURE: 98.7 F | HEIGHT: 65 IN | DIASTOLIC BLOOD PRESSURE: 79 MMHG | OXYGEN SATURATION: 98 % | WEIGHT: 138 LBS | SYSTOLIC BLOOD PRESSURE: 124 MMHG | BODY MASS INDEX: 22.99 KG/M2

## 2024-12-02 DIAGNOSIS — Z00.00 ENCOUNTER FOR GENERAL ADULT MEDICAL EXAMINATION W/OUT ABNORMAL FINDINGS: ICD-10-CM

## 2024-12-02 DIAGNOSIS — F40.243 FEAR OF FLYING: ICD-10-CM

## 2024-12-02 DIAGNOSIS — Z87.2 PERSONAL HISTORY OF DISEASES OF THE SKIN AND SUBCUTANEOUS TISSUE: ICD-10-CM

## 2024-12-02 DIAGNOSIS — R79.89 OTHER SPECIFIED ABNORMAL FINDINGS OF BLOOD CHEMISTRY: ICD-10-CM

## 2024-12-02 DIAGNOSIS — R21 RASH AND OTHER NONSPECIFIC SKIN ERUPTION: ICD-10-CM

## 2024-12-02 DIAGNOSIS — M65.4 RADIAL STYLOID TENOSYNOVITIS [DE QUERVAIN]: ICD-10-CM

## 2024-12-02 DIAGNOSIS — Z01.419 ENCOUNTER FOR GYNECOLOGICAL EXAMINATION (GENERAL) (ROUTINE) W/OUT ABNORMAL FINDINGS: ICD-10-CM

## 2024-12-02 PROCEDURE — 36415 COLL VENOUS BLD VENIPUNCTURE: CPT

## 2024-12-02 PROCEDURE — 99395 PREV VISIT EST AGE 18-39: CPT

## 2024-12-02 PROCEDURE — 99214 OFFICE O/P EST MOD 30 MIN: CPT | Mod: 25

## 2024-12-04 LAB
ALBUMIN SERPL ELPH-MCNC: 4.2 G/DL
ALP BLD-CCNC: 45 U/L
ALT SERPL-CCNC: 13 U/L
ANION GAP SERPL CALC-SCNC: 13 MMOL/L
AST SERPL-CCNC: 17 U/L
BASOPHILS # BLD AUTO: 0.03 K/UL
BASOPHILS NFR BLD AUTO: 0.4 %
BILIRUB SERPL-MCNC: 0.2 MG/DL
BUN SERPL-MCNC: 13 MG/DL
CALCIUM SERPL-MCNC: 8.9 MG/DL
CHLORIDE SERPL-SCNC: 104 MMOL/L
CHOLEST SERPL-MCNC: 172 MG/DL
CO2 SERPL-SCNC: 22 MMOL/L
CREAT SERPL-MCNC: 0.96 MG/DL
EGFR: 84 ML/MIN/1.73M2
EOSINOPHIL # BLD AUTO: 0.04 K/UL
EOSINOPHIL NFR BLD AUTO: 0.5 %
ESTIMATED AVERAGE GLUCOSE: 103 MG/DL
GLUCOSE SERPL-MCNC: 88 MG/DL
HBA1C MFR BLD HPLC: 5.2 %
HCT VFR BLD CALC: 38.5 %
HDLC SERPL-MCNC: 63 MG/DL
HGB BLD-MCNC: 13.1 G/DL
IMM GRANULOCYTES NFR BLD AUTO: 0.1 %
LDLC SERPL CALC-MCNC: 87 MG/DL
LYMPHOCYTES # BLD AUTO: 1.78 K/UL
LYMPHOCYTES NFR BLD AUTO: 24.3 %
MAN DIFF?: NORMAL
MCHC RBC-ENTMCNC: 32.9 PG
MCHC RBC-ENTMCNC: 34 G/DL
MCV RBC AUTO: 96.7 FL
MONOCYTES # BLD AUTO: 0.53 K/UL
MONOCYTES NFR BLD AUTO: 7.2 %
NEUTROPHILS # BLD AUTO: 4.95 K/UL
NEUTROPHILS NFR BLD AUTO: 67.5 %
NONHDLC SERPL-MCNC: 109 MG/DL
PLATELET # BLD AUTO: 226 K/UL
POTASSIUM SERPL-SCNC: 4 MMOL/L
PROT SERPL-MCNC: 6.8 G/DL
RBC # BLD: 3.98 M/UL
RBC # FLD: 11.9 %
SODIUM SERPL-SCNC: 139 MMOL/L
TRIGL SERPL-MCNC: 127 MG/DL
TSH SERPL-ACNC: 1.22 UIU/ML
WBC # FLD AUTO: 7.34 K/UL

## 2024-12-06 ENCOUNTER — NON-APPOINTMENT (OUTPATIENT)
Age: 24
End: 2024-12-06

## 2024-12-16 ENCOUNTER — APPOINTMENT (OUTPATIENT)
Dept: ORTHOPEDIC SURGERY | Facility: CLINIC | Age: 24
End: 2024-12-16
Payer: COMMERCIAL

## 2024-12-16 DIAGNOSIS — M65.4 RADIAL STYLOID TENOSYNOVITIS [DE QUERVAIN]: ICD-10-CM

## 2024-12-16 PROCEDURE — 25000 INCISION OF TENDON SHEATH: CPT | Mod: RT

## 2024-12-18 ENCOUNTER — NON-APPOINTMENT (OUTPATIENT)
Age: 24
End: 2024-12-18

## 2024-12-18 ENCOUNTER — APPOINTMENT (OUTPATIENT)
Dept: OBGYN | Facility: CLINIC | Age: 24
End: 2024-12-18
Payer: COMMERCIAL

## 2024-12-18 VITALS
DIASTOLIC BLOOD PRESSURE: 78 MMHG | BODY MASS INDEX: 22.99 KG/M2 | SYSTOLIC BLOOD PRESSURE: 123 MMHG | HEART RATE: 54 BPM | HEIGHT: 65 IN | WEIGHT: 138 LBS

## 2024-12-18 DIAGNOSIS — Z01.419 ENCOUNTER FOR GYNECOLOGICAL EXAMINATION (GENERAL) (ROUTINE) W/OUT ABNORMAL FINDINGS: ICD-10-CM

## 2024-12-18 PROCEDURE — 99395 PREV VISIT EST AGE 18-39: CPT

## 2024-12-18 PROCEDURE — 99459 PELVIC EXAMINATION: CPT

## 2024-12-26 ENCOUNTER — NON-APPOINTMENT (OUTPATIENT)
Age: 24
End: 2024-12-26

## 2024-12-26 LAB — CYTOLOGY CVX/VAG DOC THIN PREP: NORMAL

## 2024-12-30 ENCOUNTER — APPOINTMENT (OUTPATIENT)
Dept: ORTHOPEDIC SURGERY | Facility: CLINIC | Age: 24
End: 2024-12-30
Payer: COMMERCIAL

## 2024-12-30 DIAGNOSIS — M65.4 RADIAL STYLOID TENOSYNOVITIS [DE QUERVAIN]: ICD-10-CM

## 2024-12-30 PROCEDURE — 99024 POSTOP FOLLOW-UP VISIT: CPT

## 2025-01-02 ENCOUNTER — APPOINTMENT (OUTPATIENT)
Dept: ORTHOPEDIC SURGERY | Facility: CLINIC | Age: 25
End: 2025-01-02
